# Patient Record
Sex: MALE | Race: BLACK OR AFRICAN AMERICAN | Employment: UNEMPLOYED | URBAN - METROPOLITAN AREA
[De-identification: names, ages, dates, MRNs, and addresses within clinical notes are randomized per-mention and may not be internally consistent; named-entity substitution may affect disease eponyms.]

---

## 2017-08-04 ENCOUNTER — OFFICE VISIT (OUTPATIENT)
Dept: HEMATOLOGY | Age: 67
End: 2017-08-04

## 2017-08-04 VITALS
TEMPERATURE: 95.9 F | HEART RATE: 40 BPM | BODY MASS INDEX: 20.99 KG/M2 | DIASTOLIC BLOOD PRESSURE: 88 MMHG | OXYGEN SATURATION: 99 % | WEIGHT: 146.6 LBS | SYSTOLIC BLOOD PRESSURE: 150 MMHG | HEIGHT: 70 IN

## 2017-08-04 DIAGNOSIS — R16.0 LIVER MASS: ICD-10-CM

## 2017-08-04 DIAGNOSIS — B18.2 CHRONIC HEPATITIS C WITHOUT HEPATIC COMA (HCC): Primary | ICD-10-CM

## 2017-08-04 PROBLEM — I10 HYPERTENSION: Status: ACTIVE | Noted: 2017-08-04

## 2017-08-04 PROBLEM — K74.60 CIRRHOSIS (HCC): Status: ACTIVE | Noted: 2017-08-04

## 2017-08-04 PROBLEM — K80.20 GALLSTONES: Status: ACTIVE | Noted: 2017-08-04

## 2017-08-04 NOTE — MR AVS SNAPSHOT
Visit Information Date & Time Provider Department Dept. Phone Encounter #  
 8/4/2017  8:30 AM Hardik Fine MD [facility] [address] [place] [address] [id number] Follow-up Instructions Return for FS with MLS. Upcoming Health Maintenance Date Due Hepatitis C Screening 1950 DTaP/Tdap/Td series (1 - Tdap) 7/2/1972 FOBT Q 1 YEAR AGE 50-75 7/2/2001 ZOSTER VACCINE AGE 60> 5/2/2011 GLAUCOMA SCREENING Q2Y 7/2/2016 Pneumococcal 65+ Low/Medium Risk (1 of 2 - PCV13) 7/2/2016 MEDICARE YEARLY EXAM 7/2/2016 INFLUENZA AGE 9 TO ADULT 8/1/2017 Allergies as of 8/4/2017  Review Complete On: 8/4/2017 By: Paulino David LPN No Known Allergies Current Immunizations  Never Reviewed No immunizations on file. Not reviewed this visit You Were Diagnosed With   
  
 Codes Comments Chronic hepatitis C without hepatic coma (HCC)    -  Primary ICD-10-CM: B18.2 ICD-9-CM: 070.54 Liver mass     ICD-10-CM: R16.0 ICD-9-CM: 573.9 Vitals BP Pulse Temp Height(growth percentile) 150/88 (BP 1 Location: Left arm, BP Patient Position: Sitting) (!) 40 95.9 °F (35.5 °C) (Tympanic) 5' 10\" (1.778 m) Weight(growth percentile) SpO2 BMI Smoking Status 146 lb 9.6 oz (66.5 kg) 99% 21.03 kg/m2 Current Some Day Smoker BMI and BSA Data Body Mass Index Body Surface Area 21.03 kg/m 2 1.81 m 2 Your Updated Medication List  
  
   
This list is accurate as of: 8/4/17  9:52 AM.  Always use your most recent med list.  
  
  
  
  
 hydrOXYzine HCl 50 mg tablet Commonly known as:  ATARAX Take 1 Tab by mouth every six (6) hours as needed for Itching. We Performed the Following AFP WITH AFP-L3% [[id number] Custom] CBC WITH AUTOMATED DIFF [53898 CPT(R)] HCV NS5A DRUG RESISTANCE ASSAY [[id number] Custom] HCV RNA BY NICHOLAS QL,RFLX TO QT [99675 CPT(R)] HEP A AB, TOTAL [id number] CPT(R)] HEP B SURFACE AB [id number] CPT(R)] HEP B SURFACE AG [id number] CPT(R)] HEP C GENOTYPE [id number] CPT(R)] HEPATIC FUNCTION PANEL [81565 CPT(R)] HEPATITIS B CORE AB, TOTAL [id number] CPT(R)] METABOLIC PANEL, BASIC [76249 CPT(R)] PROTHROMBIN TIME + INR [59718 CPT(R)] Follow-up Instructions Return for FS with MLS. To-Do List   
 09/03/2017 Imaging:  MRI ABD W WO CONT Patient Instructions FIBROSCAN PATIENT INFORMATION What is Fibroscan:? 
 
Fibroscan is an ultrasound device that measures liver stiffness by sending a pulse of vibrations through the liver. This translated into an immediate result that can help your healthcare team determine the level of damage to the liver as well as monitor the condition of various liver diseases over time. Fibroscan is helpful in the evaluation of the following conditions: 
 
Chronic Hepatitis C Chronic Hepatitis B Fatty Liver Disease Alcohol Liver Disease Chronic Cholestatic Liver Diseases What happens During the Scan? Patients receiving this exam lie flat on an examination table and raise the right arm above the head. The skin over the right lower rib cage is exposed and the examiner locates the correct area to be scanned. The prove of the scanner is placed directly on the patient and triggered to start. This fells like a gentle flick against the skin and should not be uncomfortable. At least ten (10) readings are taken and the average is calculated to score the amount of liver stiffness or scar tissue. The exam should take 10-20 minutes. What do I need to do to prepare for the scan? Please do not eat or drink anything 2-4 hours  Before your Fibroscan. You should continue taking any prescribed medication and can take small sips of water or clear fluid to do so,  But avoid drinking large amounts of fluid.   Please dress comfortably in clothes that will allow for easy access to the right side of the abdomen. Women are discouraged from wearing a dress on the day of the exam. 
 
Are there any special precautions? Patients who are pregnant or have an implantable device (for example, pacemaker or defibrillator) should not have this exam performed. Patients with a significant amount of fat tissue in the area the probe is pressed may be unable to have test performed. Introducing [facility]! Kaylee Gates introduces TouchFrame patient portal. Now you can access parts of your medical record, email your doctor's office, and request medication refills online. 1. In your internet browser, go to [email or web address] 2. Click on the First Time User? Click Here link in the Sign In box. You will see the New Member Sign Up page. 3. Enter your TouchFrame Access Code exactly as it appears below. You will not need to use this code after youve completed the sign-up process. If you do not sign up before the expiration date, you must request a new code. · TouchFrame Access Code: [email or web address] Expires: 11/2/2017  9:52 AM 
 
4. Enter the last four digits of your Social Security Number (xxxx) and Date of Birth (mm/dd/yyyy) as indicated and click Submit. You will be taken to the next sign-up page. 5. Create a TouchFrame ID. This will be your TouchFrame login ID and cannot be changed, so think of one that is secure and easy to remember. 6. Create a TouchFrame password. You can change your password at any time. 7. Enter your Password Reset Question and Answer. This can be used at a later time if you forget your password. 8. Enter your e-mail address. You will receive e-mail notification when new information is available in [address] 9. Click Sign Up. You can now view and download portions of your medical record. 10. Click the Download Summary menu link to download a portable copy of your medical information. If you have questions, please visit the Frequently Asked Questions section of the Gazzangt website. Remember, Smartzer is NOT to be used for urgent needs. For medical emergencies, dial 911. Now available from your iPhone and Android! Please provide this summary of care documentation to your next provider. Your primary care clinician is listed as Latha Ruiz. If you have any questions after today's visit, please call [phone].

## 2017-08-04 NOTE — PROGRESS NOTES
[address] Souza MD, [address], [place], [place]       ALEXANDRO Hickman PA-C Alicia Fling, NP Glean Bradley, NP        at [address], [address] [facility], [address]     [phone]     FAX: [phone]    at [address], [address] [facility], [address]     [phone]     FAX: [phone]       Patient Care Team:  Jaclyn Jones MD as PCP - General (Family Practice)      Problem List  Date Reviewed: 8/4/2017          Codes Class Noted    Chronic hepatitis C without hepatic coma [facility]) ICD-10-CM: B18.2  ICD-9-CM: 070.54  8/4/2017        Liver mass ICD-10-CM: R16.0  ICD-9-CM: 573.9  8/4/2017        Cirrhosis ([facility] ICD-10-CM: K74.60  ICD-9-CM: 571.5  8/4/2017        Hypertension ICD-10-CM: I10  ICD-9-CM: 401.9  8/4/2017        Gallstones ICD-10-CM: K80.20  ICD-9-CM: 574.20  8/4/2017                The physicians listed above have asked me to see Teresa Aceves in consultation regarding chronic HCV and its management. All medical records sent by the referring physicians were reviewed including imaging studies     The patient is a 78 y.o. Black male who was noted to have abnormalities in liver chemistries and subsequently tested positive for chronic HCV in 2010. Risk factors for acquiring HCV are IV drug use in 1970s. There was no history of acute incteric hepatitis at the time of these risk factors. MRI of the liver was performed at [facility] in 5/2017. The results of the imaging suggested cirrhosis with a liver mass concerning for HCC vs dysplastic nodule LIRADs-3. An assessment of liver fibrosis with biopsy or elastography has not been performed. The patient has never received treatment for chronic HCV. The most recent laboratory studies are not available.     The patient notes fatigue,     The patient has not experienced pain in the right side over the liver, problems concentrating, swelling of the abdomen, swelling of the lower extremities, hematemesis, hematochezia. The patient completes all daily activities without any functional limitations. ALLERGIES  No Known Allergies    MEDICATIONS  Current Outpatient Prescriptions   Medication Sig    hydrOXYzine (ATARAX) 50 mg tablet Take 1 Tab by mouth every six (6) hours as needed for Itching. No current facility-administered medications for this visit. SYSTEM REVIEW NOT RELATED TO LIVER DISEASE OR REVIEWED ABOVE:  Constitution systems: Negative for fever, chills, weight gain, weight loss. Eyes: Negative for visual changes. ENT: Negative for sore throat, painful swallowing. Respiratory: Negative for cough, hemoptysis, SOB. Cardiology: Negative for chest pain, palpitations. GI:  Negative for constipation or diarrhea. : Negative for urinary frequency, dysuria, hematuria, nocturia. Skin: Negative for rash. Hematology: Negative for easy bruising, blood clots. Musculo-skelatal: Negative for back pain, muscle pain, weakness. Neurologic: Negative for headaches, dizziness, vertigo, memory problems not related to HE. Psychology: Negative for anxiety, depression. FAMILY HISTORY:  The father  of unknown cause. The mother is alive and healthy. There is no family history of liver disease. SOCIAL HISTORY:  The patient has never been . The patient has 1 child. The patient currently smokes half pack of tobacco daily. The patient consumes 3 alcoholic beverages per day. The patient used to work . The patient has not worked since [address] [place] [address]. PHYSICAL EXAMINATION:  Visit Vitals    /88 (BP 1 Location: Left arm, BP Patient Position: Sitting)    Pulse (!) 40    Temp 95.9 °F (35.5 °C) (Tympanic)    Ht 5' 10\" (1.778 m)    Wt 146 lb 9.6 oz (66.5 kg)    SpO2 99%    BMI 21.03 kg/m2     General: No acute distress.    Eyes: Sclera anicteric. ENT: No oral lesions. Thyroid normal.  Nodes: No adenopathy. Skin: No spider angiomata. No jaundice. No palmar erythema. Respiratory: Lungs clear to auscultation. Cardiovascular: Regular heart rate. No murmurs. No JVD. Abdomen: Soft non-tender. Liver size normal to percussion/palpation. Spleen not palpable. No obvious ascites. Extremities: No edema. No muscle wasting. No gross arthritic changes. Neurologic: Alert and oriented. Cranial nerves grossly intact. No asterixis. LABORATORY STUDIES:  [facility] [place] of [address] [place] [address] Units 8/4/2017   WBC 3.4 - 10.8 x10E3/uL 8.0   ANC 1.4 - 7.0 x10E3/uL 2.3   HGB 12.6 - 17.7 g/dL 17.5    - 379 x10E3/uL 252   INR 0.8 - 1.2 1.1   AST 0 - 40 IU/L 123 (H)   ALT 0 - 44 IU/L 114 (H)   Alk Phos 39 - 117 IU/L 95   Bili, Total 0.0 - 1.2 mg/dL 0.7   Bili, Direct 0.00 - 0.40 mg/dL 0.30   Albumin 3.6 - 4.8 g/dL 4.0   BUN 8 - 27 mg/dL 7 (L)   Creat 0.76 - 1.27 mg/dL 0.76   Na 134 - 144 mmol/L 143   K 3.5 - 5.2 mmol/L 4.8   Cl 96 - 106 mmol/L 100   CO2 18 - 29 mmol/L 25   Glucose 65 - 99 mg/dL 91     SEROLOGIES:  Serologies Latest Ref Rng & Units 8/4/2017   Hep A Ab, Total Negative Positive (A)   Hep B Surface Ag Negative Negative   Hep B Core Ab, Total Negative Positive (A)   Hep B Surface AB QL  Reactive   Hep C Genotype  1b   HCV RT-PCR, Quant IU/mL [id number]     LIVER HISTOLOGY:  Not available or performed    ENDOSCOPIC PROCEDURES:  Not available or performed    RADIOLOGY:  5/2017. Dynamic MRI at [facility]. Changes consistent with cirrhosis. 2.2 x 2.9 cm lesion left lobe with enhancement and washout but no rim enhancement. Consistent with dysplastic nodule. LIRADS-3    OTHER TESTING:  Not available or performed    ASSESSMENT AND PLAN:  Chronic HCV with cirrhosis. Have performed laboratory testing to monitor liver function and degree of liver injury. This included BMP, hepatic panel, CBC with platelet count, INR.       Liver function is normal.  The platelet count is normal.      Based upon laboratory studies and imaging  the patient appears to have cirrhosis. Will perform and/or review results of HCV viral load and HCV genotype to define the specific treatment and duration of treatment that will be required. Will perform serologic and virologic studies to assess for other causes of chronic liver disease. Will perform imaging of the liver with dynamic MRI. The last MRI was in 5/2017 and given the findins this should be repeated in 3 months which is now. There is no reason to perform liver biopsy at this time. The Fibroscan can assess liver fibrosis and determine if a patient has advanced fibrosis or cirrhosis without the need for liver biopsy. The Fibroscan is currently available at liver [place]. This will be performed at the next office visit. The patient has not previously been treated for HCV. The patient has HCV genotype 1B. Discussed the treatment alternatives. The SVR/cure rate for HCV now exceeds 90% with just oral anti-viral therapy and no interferon injections or significant side effects for most patients with HCV. The specific treatment is dependent upon genotype, viral load and histology. The patient should be treated with Harvoni (sofasbuvir and ledipasvir), Zepetier (Grazaprevir and Elbasvir), Mavyret (Gleceprevir and Piprentasvir), or Epclusa (sofosbuvir and valpitasvir) for 8-12 weeks. At this point will defer [address] treatment until we know the results of the repeat MRI. If this is not definative for [address] I think it is reasonable to proceed with treatment of [address] at this time. The patient was directed to continue all current medications at the current dosages. There are no contraindications for the patient to take any medications that are necessary for treatment of other medical issues. The patient was counseled regarding alcohol consumption.       Vaccination for viral hepatitis A and B is not needed. The patient has serologic evidence of prior exposure or vaccination with immunity. All of the above issues were discussed with the patient. All questions were answered. The patient expressed a clear understanding of the above. Follow-up [facility] [place] [facility] [place] for [address] and to to initiate HCV treatment.   He will need repeat MRI in     Aura Romberg, MD  [facility] [address] [address]  [phone]

## 2017-08-05 LAB
ALBUMIN SERPL-MCNC: 4 G/DL (ref 3.6–4.8)
ALP SERPL-CCNC: 95 IU/L (ref 39–117)
ALT SERPL-CCNC: 114 IU/L (ref 0–44)
AST SERPL-CCNC: 123 IU/L (ref 0–40)
BASOPHILS # BLD AUTO: 0 X10E3/UL (ref 0–0.2)
BASOPHILS NFR BLD AUTO: 0 %
BILIRUB DIRECT SERPL-MCNC: 0.3 MG/DL (ref 0–0.4)
BILIRUB SERPL-MCNC: 0.7 MG/DL (ref 0–1.2)
BUN SERPL-MCNC: 7 MG/DL (ref 8–27)
BUN/CREAT SERPL: 9 (ref 10–24)
CALCIUM SERPL-MCNC: 9.6 MG/DL (ref 8.6–10.2)
CHLORIDE SERPL-SCNC: 100 MMOL/L (ref 96–106)
CO2 SERPL-SCNC: 25 MMOL/L (ref 18–29)
CREAT SERPL-MCNC: 0.76 MG/DL (ref 0.76–1.27)
EOSINOPHIL # BLD AUTO: 0.3 X10E3/UL (ref 0–0.4)
EOSINOPHIL NFR BLD AUTO: 3 %
ERYTHROCYTE [DISTWIDTH] IN BLOOD BY AUTOMATED COUNT: 13.4 % (ref 12.3–15.4)
GLUCOSE SERPL-MCNC: 91 MG/DL (ref 65–99)
HAV AB SER QL IA: POSITIVE
HBV CORE AB SERPL QL IA: POSITIVE
HBV SURFACE AB SER QL: REACTIVE
HBV SURFACE AG SERPL QL IA: NEGATIVE
HCT VFR BLD AUTO: 50.7 % (ref 37.5–51)
HGB BLD-MCNC: 17.5 G/DL (ref 12.6–17.7)
IMM GRANULOCYTES # BLD: 0 X10E3/UL (ref 0–0.1)
IMM GRANULOCYTES NFR BLD: 0 %
INR PPP: 1.1 (ref 0.8–1.2)
LYMPHOCYTES # BLD AUTO: 4.3 X10E3/UL (ref 0.7–3.1)
LYMPHOCYTES NFR BLD AUTO: 54 %
MCH RBC QN AUTO: 31.6 PG (ref 26.6–33)
MCHC RBC AUTO-ENTMCNC: 34.5 G/DL (ref 31.5–35.7)
MCV RBC AUTO: 92 FL (ref 79–97)
MONOCYTES # BLD AUTO: 1.2 X10E3/UL (ref 0.1–0.9)
MONOCYTES NFR BLD AUTO: 15 %
NEUTROPHILS # BLD AUTO: 2.3 X10E3/UL (ref 1.4–7)
NEUTROPHILS NFR BLD AUTO: 28 %
PLATELET # BLD AUTO: 252 X10E3/UL (ref 150–379)
POTASSIUM SERPL-SCNC: 4.8 MMOL/L (ref 3.5–5.2)
PROT SERPL-MCNC: 7.1 G/DL (ref 6–8.5)
PROTHROMBIN TIME: 11.4 SEC (ref 9.1–12)
RBC # BLD AUTO: 5.54 X10E6/UL (ref 4.14–5.8)
SODIUM SERPL-SCNC: 143 MMOL/L (ref 134–144)
WBC # BLD AUTO: 8 X10E3/UL (ref 3.4–10.8)

## 2017-08-07 LAB
AFP L3 MFR SERPL: 34 % (ref 0–9.9)
AFP SERPL-MCNC: 56.1 NG/ML (ref 0–8)

## 2017-08-08 LAB
HCV GENTYP SERPL NAA+PROBE: NORMAL
HCV RNA SERPL NAA+PROBE-ACNC: NORMAL IU/ML
HCV RNA SERPL NAA+PROBE-LOG IU: 5.55 LOG10 IU/ML
HCV RNA SERPL QL NAA+PROBE: POSITIVE
PLEASE NOTE, 550474: NORMAL
TEST INFORMATION: NORMAL

## 2017-08-14 LAB
HCV NS5 MUT DET ISLT GENOTYP: NORMAL
HCV RESIS PANELL ISLT GENOTYP: NORMAL
REF LAB TEST METHOD: NORMAL

## 2017-08-21 ENCOUNTER — TELEPHONE (OUTPATIENT)
Dept: HEMATOLOGY | Age: 67
End: 2017-08-21

## 2017-08-21 NOTE — TELEPHONE ENCOUNTER
Left voice mail for patient to return call. Has he received a call from Coordination of Care to schedule future MRI? Also, reminded patient to  previous MRI from [address] [place] [address].

## 2017-08-21 NOTE — TELEPHONE ENCOUNTER
----- Message from Mark Issa MD sent at 8/13/2017  7:21 AM EDT -----  Please make sure MRI gets scheduled. I ordered this on 8/4 and do not see it is scheduled. Tell patient to  copy of previous MRI from [facility] and bring it with him to repeat MRI and hand it to tech for reading and comparison.   QUINTON

## 2017-09-12 ENCOUNTER — HOSPITAL ENCOUNTER (OUTPATIENT)
Dept: MRI IMAGING | Age: 67
Discharge: HOME | End: 2017-09-12
Attending: INTERNAL MEDICINE
Payer: MEDICARE

## 2017-09-12 DIAGNOSIS — B18.2 CHRONIC HEPATITIS C WITHOUT HEPATIC COMA (HCC): ICD-10-CM

## 2017-09-12 DIAGNOSIS — R16.0 LIVER MASS: ICD-10-CM

## 2017-09-12 PROCEDURE — 74011250636 HC RX REV CODE- 250/636: Performed by: RADIOLOGY

## 2017-09-12 PROCEDURE — 74183 MRI ABD W/O CNTR FLWD CNTR: CPT

## 2017-09-12 PROCEDURE — 74011000258 HC RX REV CODE- 258: Performed by: RADIOLOGY

## 2017-09-12 PROCEDURE — A9585 GADOBUTROL INJECTION: HCPCS | Performed by: RADIOLOGY

## 2017-09-12 RX ORDER — SODIUM CHLORIDE 9 MG/ML
50 INJECTION, SOLUTION INTRAVENOUS
Status: COMPLETED | OUTPATIENT
Start: 2017-09-12 | End: 2017-09-12

## 2017-09-12 RX ADMIN — GADOBUTROL 10 ML: 604.72 INJECTION INTRAVENOUS at 09:21

## 2017-09-12 RX ADMIN — SODIUM CHLORIDE 50 ML: 900 INJECTION, SOLUTION INTRAVENOUS at 09:00

## 2017-09-18 ENCOUNTER — TELEPHONE (OUTPATIENT)
Dept: HEMATOLOGY | Age: 67
End: 2017-09-18

## 2017-09-18 NOTE — TELEPHONE ENCOUNTER
Left voice mail for patient to return call. Attempted to contact patient to inform that the spot found on the MRI at [facility] is not present on the MRI done today at [facility] - per Dr. Aiyana White. Patient is to keep follow up appointment (10/6/17) to go results in more detail. Dr. Aiyana White will order a CT scan for patient to get in 3 months.

## 2017-10-06 ENCOUNTER — OFFICE VISIT (OUTPATIENT)
Dept: HEMATOLOGY | Age: 67
End: 2017-10-06

## 2017-10-06 VITALS
HEIGHT: 70 IN | BODY MASS INDEX: 20.67 KG/M2 | DIASTOLIC BLOOD PRESSURE: 81 MMHG | OXYGEN SATURATION: 98 % | WEIGHT: 144.4 LBS | TEMPERATURE: 98.5 F | HEART RATE: 55 BPM | SYSTOLIC BLOOD PRESSURE: 155 MMHG

## 2017-10-06 DIAGNOSIS — B18.2 CHRONIC HEPATITIS C WITHOUT HEPATIC COMA (HCC): Primary | ICD-10-CM

## 2017-10-06 NOTE — MR AVS SNAPSHOT
Visit Information Date & Time Provider Department Dept. Phone Encounter #  
 10/6/2017  4:30 PM Mani Estrada. [address] of [facility] [place] [facility] [place] [address] [id number] Upcoming Health Maintenance Date Due DTaP/Tdap/Td series (1 - Tdap) 7/2/1972 FOBT Q 1 YEAR AGE 50-75 7/2/2001 ZOSTER VACCINE AGE 60> 5/2/2011 GLAUCOMA SCREENING Q2Y 7/2/2016 Pneumococcal 65+ Low/Medium Risk (1 of 2 - PCV13) 7/2/2016 MEDICARE YEARLY EXAM 7/2/2016 INFLUENZA AGE 9 TO ADULT 8/1/2017 Allergies as of 10/6/2017  Review Complete On: 10/6/2017 By: Herschel Boas, LPN No Known Allergies Current Immunizations  Never Reviewed No immunizations on file. Not reviewed this visit Vitals BP Pulse Temp Height(growth percentile) 155/81 (BP 1 Location: Right arm, BP Patient Position: Sitting) (!) 55 98.5 °F (36.9 °C) (Tympanic) 5' 10\" (1.778 m) Weight(growth percentile) SpO2 BMI Smoking Status 144 lb 6.4 oz (65.5 kg) 98% 20.72 kg/m2 Current Some Day Smoker BMI and BSA Data Body Mass Index Body Surface Area 20.72 kg/m 2 1.8 m 2 Your Updated Medication List  
  
   
This list is accurate as of: 10/6/17  4:42 PM.  Always use your most recent med list.  
  
  
  
  
 hydrOXYzine HCl 50 mg tablet Commonly known as:  ATARAX Take 1 Tab by mouth every six (6) hours as needed for Itching. Introducing [facility]! [place] introduces [employer] patient portal. Now you can access parts of your medical record, email your doctor's office, and request medication refills online. 1. In your internet browser, go to [email or web address] 2. Click on the First Time User? Click Here link in the Sign In box. You will see the New Member Sign Up page. 3. Enter your [employer] Access Code exactly as it appears below. You will not need to use this code after youve completed the sign-up process.  If you do not sign up before the expiration date, you must request a new code. · [email or web address] Access Code: [email or web address] Expires: 11/2/2017  9:52 AM 
 
4. Enter the last four digits of your Social Security Number (xxxx) and Date of Birth (mm/dd/yyyy) as indicated and click Submit. You will be taken to the next sign-up page. 5. Create a Nebel.TV ID. This will be your [employer] login ID and cannot be changed, so think of one that is secure and easy to remember. 6. Create a [employer] password. You can change your password at any time. 7. Enter your Password Reset Question and Answer. This can be used at a later time if you forget your password. 8. Enter your e-mail address. You will receive e-mail notification when new information is available in [address] 9. Click Sign Up. You can now view and download portions of your medical record. 10. Click the Download Summary menu link to download a portable copy of your medical information. If you have questions, please visit the Frequently Asked Questions section of the [employer] website. Remember, [employer] is NOT to be used for urgent needs. For medical emergencies, dial 911. Now available from your iPhone and Android! Please provide this summary of care documentation to your next provider. Your primary care clinician is listed as Jessica Scanlon. If you have any questions after today's visit, please call [phone].

## 2017-10-06 NOTE — PROGRESS NOTES
[address] Jani De Souza MD, [place], [facility] Eder Rom[place], [place]       ALEXANDRO Jerry PA-C Matthew Kail, NP Jeneane Double, NP        at [address], [address] [facility], [address].     [phone]     FAX: [phone]    at [facility], [address] [place] [address], [address]     [phone]     FAX: [phone]       Patient Care Team:  Juanita Fitzpatrick MD as PCP - General (Family Practice)      Problem List  Date Reviewed: 8/13/2017          Codes Class Noted    Chronic hepatitis C without hepatic coma [facility]) ICD-10-CM: B18.2  ICD-9-CM: 070.54  8/4/2017        Liver mass ICD-10-CM: R16.0  ICD-9-CM: 573.9  8/4/2017        Cirrhosis ([facility] ICD-10-CM: K74.60  ICD-9-CM: 571.5  8/4/2017        Hypertension ICD-10-CM: I10  ICD-9-CM: 401.9  8/4/2017        Gallstones ICD-10-CM: K80.20  ICD-9-CM: 574.20  8/4/2017              Lonni Sprain returns to the [address] for management of chronic HCV. The active problem list, all pertinent past medical history, medications, radiologic findings and laboratory findings related to the liver disorder were reviewed with the patient. The patient is a 78 y.o. Black male who tested positive for chronic HCV in 2010. MRI of the liver was performed at [facility] in 5/2017. The results of the imaging suggested cirrhosis with a liver mass concerning for HCC vs dysplastic nodule LIRADs-3. Assessment of liver fibrosis was performed in the office today with Fibroscan. The result was 15.1 kPa which correlates with cirrhosis. The most recent imaging of the liver was MRI performed in 9/2017. Results suggest chronic liver disease. No defined liver mass was identified. The patient has never received treatment for chronic HCV.       The most recent laboratory studies indicate that the liver transaminases are elevated, alkaline phosphatase is normal, tests of hepatic synthetic and metabolic function are normal, and the platelet count is normal.      The patient notes fatigue,     The patient has not experienced pain in the right side over the liver, problems concentrating, swelling of the abdomen, swelling of the lower extremities, hematemesis, hematochezia. The patient completes all daily activities without any functional limitations. ALLERGIES  No Known Allergies    MEDICATIONS  Current Outpatient Prescriptions   Medication Sig    hydrOXYzine (ATARAX) 50 mg tablet Take 1 Tab by mouth every six (6) hours as needed for Itching. No current facility-administered medications for this visit. SYSTEM REVIEW NOT RELATED TO LIVER DISEASE OR REVIEWED ABOVE:  Constitution systems: Negative for fever, chills, weight gain, weight loss. Eyes: Negative for visual changes. ENT: Negative for sore throat, painful swallowing. Respiratory: Negative for cough, hemoptysis, SOB. Cardiology: Negative for chest pain, palpitations. GI:  Negative for constipation or diarrhea. : Negative for urinary frequency, dysuria, hematuria, nocturia. Skin: Negative for rash. Hematology: Negative for easy bruising, blood clots. Musculo-skelatal: Negative for back pain, muscle pain, weakness. Neurologic: Negative for headaches, dizziness, vertigo, memory problems not related to HE. Psychology: Negative for anxiety, depression. FAMILY HISTORY:  The father  of unknown cause. The mother is alive and healthy. There is no family history of liver disease. SOCIAL HISTORY:  The patient has never been . The patient has 1 child. The patient currently smokes half pack of tobacco daily. The patient consumes 3 alcoholic beverages per day. The patient used to work . The patient has not worked since [address] [place] [address].         PHYSICAL EXAMINATION:  Visit Vitals    /81 (BP 1 Location: Right arm, BP Patient Position: Sitting)    Pulse (!) 55    Temp 98.5 °F (36.9 °C) (Tympanic)    Ht 5' 10\" (1.778 m)    Wt 144 lb 6.4 oz (65.5 kg)    SpO2 98%    BMI 20.72 kg/m2     General: No acute distress. Eyes: Sclera anicteric. ENT: No oral lesions. Thyroid normal.  Nodes: No adenopathy. Skin: No spider angiomata. No jaundice. No palmar erythema. Respiratory: Lungs clear to auscultation. Cardiovascular: Regular heart rate. No murmurs. No JVD. Abdomen: Soft non-tender. Liver size normal to percussion/palpation. Spleen not palpable. No obvious ascites. Extremities: No edema. No muscle wasting. No gross arthritic changes. Neurologic: Alert and oriented. Cranial nerves grossly intact. No asterixis. LABORATORY STUDIES:  Liver [place] of [address] [place] [address] Units 8/4/2017   WBC 3.4 - 10.8 x10E3/uL 8.0   ANC 1.4 - 7.0 x10E3/uL 2.3   HGB 12.6 - 17.7 g/dL 17.5    - 379 x10E3/uL 252   INR 0.8 - 1.2 1.1   AST 0 - 40 IU/L 123 (H)   ALT 0 - 44 IU/L 114 (H)   Alk Phos 39 - 117 IU/L 95   Bili, Total 0.0 - 1.2 mg/dL 0.7   Bili, Direct 0.00 - 0.40 mg/dL 0.30   Albumin 3.6 - 4.8 g/dL 4.0   BUN 8 - 27 mg/dL 7 (L)   Creat 0.76 - 1.27 mg/dL 0.76   Na 134 - 144 mmol/L 143   K 3.5 - 5.2 mmol/L 4.8   Cl 96 - 106 mmol/L 100   CO2 18 - 29 mmol/L 25   Glucose 65 - 99 mg/dL 91     SEROLOGIES:  Serologies Latest Ref Rng & Units 8/4/2017   Hep A Ab, Total Negative Positive (A)   Hep B Surface Ag Negative Negative   Hep B Core Ab, Total Negative Positive (A)   Hep B Surface AB QL  Reactive   Hep C Genotype  1b   HCV RT-PCR, Quant IU/mL [id number]     LIVER HISTOLOGY:  10/2017. FibroScan performed at [facility]. EkPa was 15.1. IQR/med 9%. The results suggested a fibrosis level of F3-4. ENDOSCOPIC PROCEDURES:  Not available or performed    RADIOLOGY:  5/2017. Dynamic MRI at [facility]. Changes consistent with cirrhosis.   2.2 x 2.9 cm lesion left lobe with enhancement and washout but no rim enhancement. Consistent with dysplastic nodule. LIRADS-3  9/2017. Dynamic MRI of liver. Normal appearing liver. No evidence of cirrhosis. No liver mass lesions. Normal spleen. No dilated bile ducts. No bile duct strictures. No ascites. OTHER TESTING:  Not available or performed    ASSESSMENT AND PLAN:  Chronic HCV with stage 3 advanced fibrosis/cirrhosis. Liver function is normal.  The platelet count is normal.      An MRI at [facility] in 5/2017 suggested a liver mass LIRADS-3. Repeat MRI at [facility] in 9/2017 did not suggest cirrhosis and no definative mass was seen. Will need to repeat MRI again in 3 months which would be 12/2017 for at least another 6 months. .      The patient has not previously been treated for HCV. The patient has HCV genotype 1B. Discussed the treatment alternatives. The SVR/cure rate for HCV now exceeds 90% with just oral anti-viral therapy and no interferon injections or significant side effects for most patients with HCV. The specific treatment is dependent upon genotype, viral load and histology. The patient should be treated with Harvoni (sofasbuvir and ledipasvir), Zepetier (Grazaprevir and Elbasvir), Mavyret (Gleceprevir and Piprentasvir), or Epclusa (sofosbuvir and valpitasvir) for 8-12 weeks. Will proceed with HCV treatment. I do not think the patient has [address] but will repeat the MRI at 3 month intervals until we are certain. The patient was directed to continue all current medications at the current dosages. There are no contraindications for the patient to take any medications that are necessary for treatment of other medical issues. The patient was counseled regarding alcohol consumption. Vaccination for viral hepatitis A and B is not needed. The patient has serologic evidence of prior exposure or vaccination with immunity. All of the above issues were discussed with the patient. All questions were answered.   The patient expressed a clear understanding of the above. Follow-up [facility] for [address] and to to initiate HCV treatment.       Nicolette Garcia MD  Liver [facility] [address] [address] [facility] [address] [place] [address], [facility] 22.  [phone]

## 2017-11-02 DIAGNOSIS — B18.2 CHRONIC HEPATITIS C WITHOUT HEPATIC COMA (HCC): Primary | ICD-10-CM

## 2017-11-02 RX ORDER — LEDIPASVIR AND SOFOSBUVIR 90; 400 MG/1; MG/1
90-400 TABLET, FILM COATED ORAL DAILY
Qty: 28 TAB | Refills: 2 | Status: SHIPPED | OUTPATIENT
Start: 2017-11-02 | End: 2018-07-12 | Stop reason: ALTCHOICE

## 2017-11-20 ENCOUNTER — DOCUMENTATION ONLY (OUTPATIENT)
Dept: HEMATOLOGY | Age: 67
End: 2017-11-20

## 2017-11-20 DIAGNOSIS — R16.0 LIVER MASS: Primary | ICD-10-CM

## 2017-11-20 NOTE — PROGRESS NOTES
Sent email to JYOTHI Chou to call pt and advise him of follow up MRI ordered for 12/18/17. Will call him with results after obtained. Tamara Mccloud NP  8:38 AM

## 2018-03-26 ENCOUNTER — OFFICE VISIT (OUTPATIENT)
Dept: HEMATOLOGY | Age: 68
End: 2018-03-26

## 2018-03-26 VITALS
WEIGHT: 146 LBS | BODY MASS INDEX: 20.95 KG/M2 | DIASTOLIC BLOOD PRESSURE: 86 MMHG | OXYGEN SATURATION: 100 % | TEMPERATURE: 98 F | HEART RATE: 58 BPM | SYSTOLIC BLOOD PRESSURE: 153 MMHG

## 2018-03-26 DIAGNOSIS — R16.0 LIVER MASS: ICD-10-CM

## 2018-03-26 DIAGNOSIS — B18.2 CHRONIC HEPATITIS C WITHOUT HEPATIC COMA (HCC): Primary | ICD-10-CM

## 2018-03-26 NOTE — PROGRESS NOTES
1. Have you been to the ER, urgent care clinic since your last visit? Hospitalized since your last visit? No    2. Have you seen or consulted any other health care providers outside of the [address] [place] [address] since your last visit? Include any pap smears or colon screening. No   Chief Complaint   Patient presents with    Follow-up     Visit Vitals    /86 (BP 1 Location: Left arm, BP Patient Position: Sitting)    Pulse (!) 58    Temp 98 °F (36.7 °C) (Tympanic)    Wt 146 lb (66.2 kg)    SpO2 100%    BMI 20.95 kg/m2     PHQ over the last two weeks 3/26/2018   Little interest or pleasure in doing things Not at all   Feeling down, depressed or hopeless Not at all   Total Score PHQ 2 0     Fall Risk Assessment, last 12 mths 3/26/2018   Able to walk? Yes   Fall in past 12 months?  No   Fall with injury? -   Number of falls in past 12 months -   Fall Risk Score -     Learning Assessment 3/26/2018   PRIMARY LEARNER Patient   BARRIERS PRIMARY LEARNER NONE   CO-LEARNER CAREGIVER No   PRIMARY LANGUAGE ENGLISH   LEARNER PREFERENCE PRIMARY LISTENING   ANSWERED BY patient    RELATIONSHIP SELF

## 2018-03-26 NOTE — PROGRESS NOTES
[address] Jani De Souza MD, [address], [facility] [place]       Bhargav Massey, GABRIELA Bundy NP Gillian Pole, ALEXANDRO        at [address], [address] [address]  [address].     [phone]     FAX: [phone]    at [facility], [address] [place] [address], [address]     [phone]     FAX: [phone]       Patient Care Team:  Naeem Holland MD as PCP - General (Family Practice)      Problem List  Date Reviewed: 10/8/2017          Codes Class Noted    Chronic hepatitis C without hepatic coma [facility]) ICD-10-CM: B18.2  ICD-9-CM: 070.54  8/4/2017        Liver mass ICD-10-CM: R16.0  ICD-9-CM: 573.9  8/4/2017        Cirrhosis ([facility] ICD-10-CM: K74.60  ICD-9-CM: 571.5  8/4/2017        Hypertension ICD-10-CM: I10  ICD-9-CM: 401.9  8/4/2017        Gallstones ICD-10-CM: K80.20  ICD-9-CM: 574.20  8/4/2017              Paul Anrold returns to the [address] for management of chronic HCV. The active problem list, all pertinent past medical history, medications, radiologic findings and laboratory findings related to the liver disorder were reviewed with the patient. The patient is a 78 y.o. Black male who tested positive for chronic HCV in 2010. MRI of the liver was performed at [facility] in 5/2017. The results of the imaging suggested cirrhosis with a liver mass concerning for HCC vs dysplastic nodule LIRADs-3. Assessment of liver fibrosis was performed in the office today with FibroScan. The result was 15.1 kPa which correlates with cirrhosis. The most recent imaging of the liver was MRI performed in 9/2017. Results suggest chronic liver disease. No defined liver mass was identified. The patient is currently on 12 weeks of treatment with Carvin Peabody. He started in December and still has 2 bottles left.  He has missed or rescheduled all previous treatment follow up appointments. He states he takes it about 3 times a week. The most recent laboratory studies indicate that the liver transaminases are elevated, alkaline phosphatase is normal, tests of hepatic synthetic and metabolic function are normal, and the platelet count is normal.      The patient notes fatigue. The patient has not experienced pain in the right side over the liver, problems concentrating, swelling of the abdomen, swelling of the lower extremities, hematemesis, hematochezia. The patient completes all daily activities without any functional limitations. ALLERGIES  No Known Allergies    MEDICATIONS  Current Outpatient Prescriptions   Medication Sig    ledipasvir-sofosbuvir (HARVONI)  mg tab Take  mg by mouth daily. Indications: CHRONIC HEPATITIS C - GENOTYPE 1, 1B, cirrhosis, treatment naive     No current facility-administered medications for this visit. SYSTEM REVIEW NOT RELATED TO LIVER DISEASE OR REVIEWED ABOVE:  Constitution systems: Negative for fever, chills, weight gain, weight loss. Eyes: Negative for visual changes. ENT: Negative for sore throat, painful swallowing. Respiratory: Negative for cough, hemoptysis, SOB. Cardiology: Negative for chest pain, palpitations. GI:  Negative for constipation or diarrhea. : Negative for urinary frequency, dysuria, hematuria, nocturia. Skin: Negative for rash. Hematology: Negative for easy bruising, blood clots. Musculo-skeletal: Negative for back pain, muscle pain, weakness. Neurologic: Negative for headaches, dizziness, vertigo, memory problems not related to HE. Psychology: Negative for anxiety, depression. FAMILY HISTORY:  The father  of unknown cause. The mother is alive and healthy. There is no family history of liver disease. SOCIAL HISTORY:  The patient has never been . The patient has 1 child.     The patient currently smokes half pack of tobacco daily.    The patient consumes 3 alcoholic beverages per day. The patient used to work . The patient has not worked since [address] [place] [address]. PHYSICAL EXAMINATION:  Visit Vitals    /86 (BP 1 Location: Left arm, BP Patient Position: Sitting)    Pulse (!) 58    Temp 98 °F (36.7 °C) (Tympanic)    Wt 146 lb (66.2 kg)    SpO2 100%    BMI 20.95 kg/m2     General: No acute distress. Eyes: Sclera anicteric. ENT: No oral lesions. Nodes: No adenopathy. Skin: No spider angiomata. No jaundice. No palmar erythema. Respiratory: Lungs clear to auscultation. Cardiovascular: Regular heart rate. No murmurs. No JVD. Abdomen: Soft non-tender. Liver size normal to percussion/palpation. Spleen not palpable. No obvious ascites. Extremities: No edema. No muscle wasting. No gross arthritic changes. Neurologic: Alert and oriented. Cranial nerves grossly intact. No asterixis. LABORATORY STUDIES:  Liver [place] of [address] Units 8/4/2017   WBC 3.4 - 10.8 x10E3/uL 8.0   ANC 1.4 - 7.0 x10E3/uL 2.3   HGB 12.6 - 17.7 g/dL 17.5    - 379 x10E3/uL 252   INR 0.8 - 1.2 1.1   AST 0 - 40 IU/L 123 (H)   ALT 0 - 44 IU/L 114 (H)   Alk Phos 39 - 117 IU/L 95   Bili, Total 0.0 - 1.2 mg/dL 0.7   Bili, Direct 0.00 - 0.40 mg/dL 0.30   Albumin 3.6 - 4.8 g/dL 4.0   BUN 8 - 27 mg/dL 7 (L)   Creat 0.76 - 1.27 mg/dL 0.76   Na 134 - 144 mmol/L 143   K 3.5 - 5.2 mmol/L 4.8   Cl 96 - 106 mmol/L 100   CO2 18 - 29 mmol/L 25   Glucose 65 - 99 mg/dL 91     SEROLOGIES:  Serologies Latest Ref Rng & Units 8/4/2017   Hep A Ab, Total Negative Positive (A)   Hep B Surface Ag Negative Negative   Hep B Core Ab, Total Negative Positive (A)   Hep B Surface AB QL  Reactive   Hep C Genotype  1b   HCV RT-PCR, Quant IU/mL [id number]     LIVER HISTOLOGY:  10/2017. [facility]. EkPa was 15.1. IQR/med 9%. The results suggested a fibrosis level of F3-4.     ENDOSCOPIC PROCEDURES:  Not available or performed    RADIOLOGY:  5/2017. Dynamic MRI at [facility]. Changes consistent with cirrhosis. 2.2 x 2.9 cm lesion left lobe with enhancement and washout but no rim enhancement. Consistent with dysplastic nodule. LIRADS-3  9/2017. Dynamic MRI of liver. Normal appearing liver. No evidence of cirrhosis. No liver mass lesions. Normal spleen. No dilated bile ducts. No bile duct strictures. No ascites. OTHER TESTING:  Not available or performed    ASSESSMENT AND PLAN:  Chronic HCV with stage 3 advanced fibrosis/cirrhosis. Liver function is normal.  The platelet count is normal.      An MRI at [facility] in 5/2017 suggested a liver mass LIRADS-3. Repeat MRI at [facility] in 9/2017 did not suggest cirrhosis and no definitive mass was seen. Will need to repeat MRI again in 3 months which would be 12/2017 for at least another 6 months. This was ordered today. The patient is on Harvoni. He has been at times non-compliant with medication. I have emphatically stressed the importance of taking the medication consistently and daily. The patient has HCV genotype 1B. The patient was directed to continue all current medications at the current dosages. There are no contraindications for the patient to take any medications that are necessary for treatment of other medical issues. The patient was counseled regarding alcohol consumption. Vaccination for viral hepatitis A and B is not needed. The patient has serologic evidence of prior exposure or vaccination with immunity. All of the above issues were discussed with the patient. All questions were answered. The patient expressed a clear understanding of the above. [facility] 87 in 2 months.      Stephani Pandya MD  Liver [place] of [address] [place] [address] [address] [facility].  [phone]

## 2018-03-26 NOTE — MR AVS SNAPSHOT
2700 [facility] [phone] [address] [place] [address] 
[phone] Patient: Moi Carson MRN: [id number] DUPONT:[birth year] Visit Information Date & Time Provider Department Dept. Phone Encounter #  
 3/26/2018 10:10 AM Patra Siemens L. Karn Diones, 3687 [facility] 219 [id number] Upcoming Health Maintenance Date Due DTaP/Tdap/Td series (1 - Tdap) 7/2/1972 FOBT Q 1 YEAR AGE 50-75 7/2/2001 ZOSTER VACCINE AGE 60> 5/2/2011 GLAUCOMA SCREENING Q2Y 7/2/2016 Pneumococcal 65+ Low/Medium Risk (1 of 2 - PCV13) 7/2/2016 Influenza Age 5 to Adult 8/1/2017 MEDICARE YEARLY EXAM 3/14/2018 Allergies as of 3/26/2018  Review Complete On: 3/26/2018 By: Tasha Canales No Known Allergies Current Immunizations  Never Reviewed No immunizations on file. Not reviewed this visit You Were Diagnosed With   
  
 Codes Comments Chronic hepatitis C without hepatic coma (HCC)    -  Primary ICD-10-CM: B18.2 ICD-9-CM: 070.54 Liver mass     ICD-10-CM: R16.0 ICD-9-CM: 573.9 Vitals BP Pulse Temp Weight(growth percentile) SpO2 BMI  
 153/86 (BP 1 Location: Left arm, BP Patient Position: Sitting) (!) 58 98 °F (36.7 °C) (Tympanic) 146 lb (66.2 kg) 100% 20.95 kg/m2 Smoking Status Current Some Day Smoker BMI and BSA Data Body Mass Index Body Surface Area  
 20.95 kg/m 2 1.81 m 2 Your Updated Medication List  
  
   
This list is accurate as of 3/26/18 10:46 AM.  Always use your most recent med list.  
  
  
  
  
 ledipasvir-sofosbuvir  mg Tab Commonly known as:  Ellan Corpus Take  mg by mouth daily. Indications: CHRONIC HEPATITIS C - GENOTYPE 1, 1B, cirrhosis, treatment naive We Performed the Following CBC W/O DIFF [88879 CPT(R)] HCV RNA BY NICHOLAS QL,RFLX TO QT [93973 CPT(R)] HEPATIC FUNCTION PANEL (6) [TRE726500 Custom] METABOLIC PANEL, BASIC [50170 CPT(R)] To-Do List   
 04/13/2018 Imaging:  MRI ABD W MRCP W WO CONT Introducing [facility]! James Licona introduces [employer] patient portal. Now you can access parts of your medical record, email your doctor's office, and request medication refills online. 1. In your internet browser, go to [email or web address] 2. Click on the First Time User? Click Here link in the Sign In box. You will see the New Member Sign Up page. 3. Enter your [employer] Access Code exactly as it appears below. You will not need to use this code after youve completed the sign-up process. If you do not sign up before the expiration date, you must request a new code. · [employer] Access Code: [email or web address] Expires: 6/24/2018 10:46 AM 
 
4. Enter the last four digits of your Social Security Number (xxxx) and Date of Birth (mm/dd/yyyy) as indicated and click Submit. You will be taken to the next sign-up page. 5. Create a [employer] ID. This will be your [employer] login ID and cannot be changed, so think of one that is secure and easy to remember. 6. Create a [employer] password. You can change your password at any time. 7. Enter your Password Reset Question and Answer. This can be used at a later time if you forget your password. 8. Enter your e-mail address. You will receive e-mail notification when new information is available in [address] 9. Click Sign Up. You can now view and download portions of your medical record. 10. Click the Download Summary menu link to download a portable copy of your medical information. If you have questions, please visit the Frequently Asked Questions section of the [employer] website. Remember, [employer] is NOT to be used for urgent needs. For medical emergencies, dial 911. Now available from your iPhone and Android! Please provide this summary of care documentation to your next provider. Your primary care clinician is listed as Nicolle Haro. If you have any questions after today's visit, please call [phone].

## 2018-03-27 LAB
ALBUMIN SERPL-MCNC: 3.7 G/DL (ref 3.6–4.8)
ALP SERPL-CCNC: 75 IU/L (ref 39–117)
ALT SERPL-CCNC: 42 IU/L (ref 0–44)
AST SERPL-CCNC: 44 IU/L (ref 0–40)
BILIRUB DIRECT SERPL-MCNC: 0.19 MG/DL (ref 0–0.4)
BILIRUB SERPL-MCNC: 0.5 MG/DL (ref 0–1.2)
BUN SERPL-MCNC: 9 MG/DL (ref 8–27)
BUN/CREAT SERPL: 11 (ref 10–24)
CALCIUM SERPL-MCNC: 9.1 MG/DL (ref 8.6–10.2)
CHLORIDE SERPL-SCNC: 102 MMOL/L (ref 96–106)
CO2 SERPL-SCNC: 29 MMOL/L (ref 18–29)
CREAT SERPL-MCNC: 0.8 MG/DL (ref 0.76–1.27)
ERYTHROCYTE [DISTWIDTH] IN BLOOD BY AUTOMATED COUNT: 13.2 % (ref 12.3–15.4)
GFR SERPLBLD CREATININE-BSD FMLA CKD-EPI: 108 ML/MIN/1.73
GFR SERPLBLD CREATININE-BSD FMLA CKD-EPI: 93 ML/MIN/1.73
GLUCOSE SERPL-MCNC: 91 MG/DL (ref 65–99)
HCT VFR BLD AUTO: 47.8 % (ref 37.5–51)
HGB BLD-MCNC: 15.8 G/DL (ref 13–17.7)
MCH RBC QN AUTO: 31.2 PG (ref 26.6–33)
MCHC RBC AUTO-ENTMCNC: 33.1 G/DL (ref 31.5–35.7)
MCV RBC AUTO: 94 FL (ref 79–97)
PLATELET # BLD AUTO: 233 X10E3/UL (ref 150–379)
POTASSIUM SERPL-SCNC: 4.6 MMOL/L (ref 3.5–5.2)
RBC # BLD AUTO: 5.07 X10E6/UL (ref 4.14–5.8)
SODIUM SERPL-SCNC: 142 MMOL/L (ref 134–144)
WBC # BLD AUTO: 5 X10E3/UL (ref 3.4–10.8)

## 2018-03-29 LAB
HCV RNA SERPL NAA+PROBE-ACNC: NORMAL IU/ML
HCV RNA SERPL NAA+PROBE-LOG IU: 4.24 LOG10 IU/ML
HCV RNA SERPL QL NAA+PROBE: POSITIVE
TEST INFORMATION: NORMAL

## 2018-03-30 NOTE — PROGRESS NOTES
Virus still present but down. Pt was not taking it consistently. Left VM for pt. Gave results and told him to make sure he is taking it every day.

## 2018-05-21 ENCOUNTER — OFFICE VISIT (OUTPATIENT)
Dept: HEMATOLOGY | Age: 68
End: 2018-05-21

## 2018-05-21 VITALS
OXYGEN SATURATION: 97 % | TEMPERATURE: 97.5 F | HEART RATE: 54 BPM | WEIGHT: 146.4 LBS | HEIGHT: 70 IN | BODY MASS INDEX: 20.96 KG/M2 | SYSTOLIC BLOOD PRESSURE: 139 MMHG | DIASTOLIC BLOOD PRESSURE: 74 MMHG

## 2018-05-21 DIAGNOSIS — B18.2 CHRONIC HEPATITIS C WITHOUT HEPATIC COMA (HCC): Primary | ICD-10-CM

## 2018-05-21 NOTE — PROGRESS NOTES
[address] MD Ap, [address], [place], [place]       ALEXANDRO Rey PA-C [employer], ALEXANDRO Mancera NP        at [address]     217 [facility], [address] [place] [address]  22.     [phone]     FAX: [phone]    at [facility], [address] [place] [address], [address]     [phone]     FAX: [phone]     Patient Care Team:  Griselda Griffiths MD as PCP - General (Family Practice)    Problem List  Date Reviewed: 3/26/2018          Codes Class Noted    Chronic hepatitis C without hepatic coma [facility]) ICD-10-CM: B18.2  ICD-9-CM: 070.54  8/4/2017        Liver mass ICD-10-CM: R16.0  ICD-9-CM: 573.9  8/4/2017        Cirrhosis ([facility] [email or web address] 75.) ICD-10-CM: K74.60  ICD-9-CM: 571.5  8/4/2017        Hypertension ICD-10-CM: I10  ICD-9-CM: 401.9  8/4/2017        Gallstones ICD-10-CM: K80.20  ICD-9-CM: 574.20  8/4/2017            Taina Peña returns to the [address] for management of chronic HCV. The active problem list, all pertinent past medical history, medications, radiologic findings and laboratory findings related to the liver disorder were reviewed with the patient. The patient is a 78 y.o. Black male who tested positive for chronic HCV in 2010. MRI of the liver was performed at [facility] in 5/2017. The results of the imaging suggested cirrhosis with a liver mass concerning for HCC vs dysplastic nodule LIRADs-3. Assessment of liver fibrosis was performed with FibroScan. The result was 15.1 kPa which correlates with cirrhosis. The most recent imaging of the liver was MRI performed in 9/2017. Results suggest chronic liver disease. No defined liver mass was identified. The patient states he has been taking the medication every night but still has 1 bottle of medicine left. He should be done by now or pretty close.      The most recent laboratory studies indicate the liver transaminases are elevated, alkaline phosphatase is normal, tests of hepatic synthetic and metabolic function are normal, and the platelet count is normal.      The patient notes fatigue. The patient has not experienced pain in the right side over the liver, problems concentrating, swelling of the abdomen, swelling of the lower extremities, hematemesis, hematochezia. The patient completes all daily activities without any functional limitations. ALLERGIES  No Known Allergies    MEDICATIONS  Current Outpatient Prescriptions   Medication Sig    ledipasvir-sofosbuvir (HARVONI)  mg tab Take  mg by mouth daily. Indications: CHRONIC HEPATITIS C - GENOTYPE 1, 1B, cirrhosis, treatment naive     No current facility-administered medications for this visit. SYSTEM REVIEW NOT RELATED TO LIVER DISEASE OR REVIEWED ABOVE:  Constitution systems: Negative for fever, chills, weight gain, weight loss. Eyes: Negative for visual changes. ENT: Negative for sore throat, painful swallowing. Respiratory: Negative for cough, hemoptysis, SOB. Cardiology: Negative for chest pain, palpitations. GI:  Negative for constipation or diarrhea. : Negative for urinary frequency, dysuria, hematuria, nocturia. Skin: Negative for rash. Hematology: Negative for easy bruising, blood clots. Musculo-skeletal: Negative for back pain, muscle pain, weakness. Neurologic: Negative for headaches, dizziness, vertigo, memory problems not related to HE. Psychology: Negative for anxiety, depression. FAMILY HISTORY:  The father  of unknown cause. The mother is alive and healthy. There is no family history of liver disease. SOCIAL HISTORY:  The patient has never been . The patient has 1 child. The patient currently smokes half pack of tobacco daily. The patient consumes 3 alcoholic beverages per day. The patient used to work .   The patient has not worked since 19. PHYSICAL EXAMINATION:  Visit Vitals    /74 (BP 1 Location: Left arm, BP Patient Position: Sitting)    Pulse (!) 54    Temp 97.5 °F (36.4 °C) (Tympanic)    Ht 5' 10\" (1.778 m)    Wt 146 lb 6.4 oz (66.4 kg)    SpO2 97%    BMI 21.01 kg/m2     General: No acute distress. Eyes: Sclera anicteric. ENT: No oral lesions. Nodes: No adenopathy. Skin: No spider angiomata. No jaundice. No palmar erythema. Respiratory: Lungs clear to auscultation. Cardiovascular: Regular heart rate. No murmurs. No JVD. Abdomen: Soft non-tender. Liver size normal to percussion/palpation. Spleen not palpable. No obvious ascites. Extremities: No edema. No muscle wasting. No gross arthritic changes. Neurologic: Alert and oriented. Cranial nerves grossly intact. No asterixis. LABORATORY STUDIES:  Liver [place] of [address] Units 3/26/2018 8/4/2017 3/17/2015   WBC 3.4 - 10.8 x10E3/uL 5.0 8.0 8.9   ANC 1.4 - 7.0 x10E3/uL  2.3 4.3   HGB 13.0 - 17.7 g/dL 15.8 17.5 15.2    - 379 x10E3/uL 233 252 238   INR 0.8 - 1.2  1.1    AST 0 - 40 IU/L 44 (H) 123 (H)    ALT 0 - 44 IU/L 42 114 (H)    Alk Phos 39 - 117 IU/L 75 95    Bili, Total 0.0 - 1.2 mg/dL 0.5 0.7    Bili, Direct 0.00 - 0.40 mg/dL 0.19 0.30    Albumin 3.6 - 4.8 g/dL 3.7 4.0    BUN 8 - 27 mg/dL 9 7 (L)    Creat 0.76 - 1.27 mg/dL 0.80 0.76    Na 134 - 144 mmol/L 142 143    K 3.5 - 5.2 mmol/L 4.6 4.8    Cl 96 - 106 mmol/L 102 100    CO2 18 - 29 mmol/L 29 25    Glucose 65 - 99 mg/dL 91 91        SEROLOGIES:  Serologies Latest Ref Rng & Units 3/26/2018 8/4/2017   Hep A Ab, Total Negative  Positive (A)   Hep B Surface Ag Negative  Negative   Hep B Core Ab, Total Negative  Positive (A)   Hep B Surface AB QL   Reactive   Hep C Genotype   1b   HCV RT-PCR, Quant IU/mL [id number]     LIVER HISTOLOGY:  10/2017. [facility]Scan [facility] at [facility]. EkPa was 15.1. IQR/med 9%.  The results suggested a fibrosis level of F3-4. ENDOSCOPIC PROCEDURES:  Not available or performed    RADIOLOGY:  5/2017. Dynamic MRI at [facility]. Changes consistent with cirrhosis. 2.2 x 2.9 cm lesion left lobe with enhancement and washout but no rim enhancement. Consistent with dysplastic nodule. LIRADS-3  9/2017. Dynamic MRI of liver. Normal appearing liver. No evidence of cirrhosis. No liver mass lesions. Normal spleen. No dilated bile ducts. No bile duct strictures. No ascites. OTHER TESTING:  Not available or performed    ASSESSMENT AND PLAN:  Chronic HCV with stage 3 advanced fibrosis/cirrhosis. Liver function is normal.  The platelet count is normal.      An MRI at [facility] in 5/2017 suggested a liver mass LIRADS-3. Repeat MRI at [facility] in 9/2017 did not suggest cirrhosis and no definitive mass was seen. The patient has not gotten the repeat MRI which was ordered last visit. Will provide him with radiology number to schedule. The patient was directed to continue all current medications at the current dosages. There are no contraindications for the patient to take any medications that are necessary for treatment of other medical issues. The patient was counseled regarding alcohol consumption. Vaccination for viral hepatitis A and B is not needed. The patient has serologic evidence of prior exposure or vaccination with immunity. All of the above issues were discussed with the patient. All questions were answered. The patient expressed a clear understanding of the above. [facility] 87 in 5 weeks and hopefully, he will have completed therapy.      RYAN Spring-BC  Liver [place] of [address], [id number] [place] [address], [facility] 22. [phone]

## 2018-05-21 NOTE — PROGRESS NOTES
Chief Complaint   Patient presents with    Follow-up     Visit Vitals    /74 (BP 1 Location: Left arm, BP Patient Position: Sitting)    Pulse (!) 54    Temp 97.5 °F (36.4 °C) (Tympanic)    Ht 5' 10\" (1.778 m)    Wt 146 lb 6.4 oz (66.4 kg)    SpO2 97%    BMI 21.01 kg/m2     PHQ over the last two weeks 3/26/2018   Little interest or pleasure in doing things Not at all   Feeling down, depressed or hopeless Not at all   Total Score PHQ 2 0     1. Have you been to the ER, urgent care clinic since your last visit? Hospitalized since your last visit? 2. Have you seen or consulted any other health care providers outside of the [facility] since your last visit? Include any pap smears or colon screening. No    Abuse Screening Questionnaire 5/21/2018   Do you ever feel afraid of your partner? N   Are you in a relationship with someone who physically or mentally threatens you? N   Is it safe for you to go home?  Rosendo Median

## 2018-05-21 NOTE — MR AVS SNAPSHOT
1111 [facility] [phone] [address] [place] [address] 
[phone] Patient: Paris [place] MRN: [id number] WFL:[birth year] Visit Information Date & Time Provider Department Dept. Phone Encounter #  
 5/21/2018  8:45 AM Quinn Rubi, 3687 [facility] 219 [id number] Follow-up Instructions Return in about 5 weeks (around 6/25/2018) for gordo BUTCHER. Routing History Upcoming Health Maintenance Date Due DTaP/Tdap/Td series (1 - Tdap) 7/2/1972 FOBT Q 1 YEAR AGE 50-75 7/2/2001 ZOSTER VACCINE AGE 60> 5/2/2011 GLAUCOMA SCREENING Q2Y 7/2/2016 Pneumococcal 65+ Low/Medium Risk (1 of 2 - PCV13) 7/2/2016 MEDICARE YEARLY EXAM 3/14/2018 Influenza Age 5 to Adult 8/1/2018 Allergies as of 5/21/2018  Review Complete On: 5/21/2018 By: Moraima Negron. Deepthi Rubi, ALEXANDRO No Known Allergies Current Immunizations  Never Reviewed No immunizations on file. Not reviewed this visit You Were Diagnosed With   
  
 Codes Comments Chronic hepatitis C without hepatic coma (HCC)    -  Primary ICD-10-CM: B18.2 ICD-9-CM: 070.54 Vitals BP Pulse Temp Height(growth percentile) 139/74 (BP 1 Location: Left arm, BP Patient Position: Sitting) (!) 54 97.5 °F (36.4 °C) (Tympanic) 5' 10\" (1.778 m) Weight(growth percentile) SpO2 BMI Smoking Status 146 lb 6.4 oz (66.4 kg) 97% 21.01 kg/m2 Current Some Day Smoker BMI and BSA Data Body Mass Index Body Surface Area 21.01 kg/m 2 1.81 m 2 Your Updated Medication List  
  
   
This list is accurate as of 5/21/18  9:12 AM.  Always use your most recent med list.  
  
  
  
  
 ledipasvir-sofosbuvir  mg Tab Commonly known as:  Jenny Chris Take  mg by mouth daily. Indications: CHRONIC HEPATITIS C - GENOTYPE 1, 1B, cirrhosis, treatment naive We Performed the Following AFP WITH AFP-L3% [[id number] Custom] CBC W/O DIFF [05146 CPT(R)] HCV RNA BY NICHOLAS QL,RFLX TO QT [39173 CPT(R)] HEPATIC FUNCTION PANEL [63562 CPT(R)] METABOLIC PANEL, BASIC [18068 CPT(R)] PROTHROMBIN TIME + INR [87230 CPT(R)] Follow-up Instructions Return in about 5 weeks (around 6/25/2018) for gordo BUTCHER. Introducing hospitals & HEALTH SERVICES! [facility] introduces "[employer]" patient portal. Now you can access parts of your medical record, email your doctor's office, and request medication refills online. 1. In your internet browser, go to [email or web address] 2. Click on the First Time User? Click Here link in the Sign In box. You will see the New Member Sign Up page. 3. Enter your "[employer]" Access Code exactly as it appears below. You will not need to use this code after youve completed the sign-up process. If you do not sign up before the expiration date, you must request a new code. · "[employer]" Access Code: [email or web address] Expires: 6/24/2018 10:46 AM 
 
4. Enter the last four digits of your Social Security Number (xxxx) and Date of Birth (mm/dd/yyyy) as indicated and click Submit. You will be taken to the next sign-up page. 5. Create a "[employer]" ID. This will be your "[employer]" login ID and cannot be changed, so think of one that is secure and easy to remember. 6. Create a "[employer]" password. You can change your password at any time. 7. Enter your Password Reset Question and Answer. This can be used at a later time if you forget your password. 8. Enter your e-mail address. You will receive e-mail notification when new information is available in [address] 9. Click Sign Up. You can now view and download portions of your medical record. 10. Click the Download Summary menu link to download a portable copy of your medical information. If you have questions, please visit the Frequently Asked Questions section of the "[employer]" website.  Remember, "[employer]" is NOT to be used for urgent needs. For medical emergencies, dial 911. Now available from your iPhone and Android! Please provide this summary of care documentation to your next provider. Your primary care clinician is listed as Fredick Seay. If you have any questions after today's visit, please call [phone].

## 2018-05-22 LAB
AFP L3 MFR SERPL: 9.3 % (ref 0–9.9)
AFP SERPL-MCNC: 11.3 NG/ML (ref 0–8)
ALBUMIN SERPL-MCNC: 4.3 G/DL (ref 3.6–4.8)
ALP SERPL-CCNC: 73 IU/L (ref 39–117)
ALT SERPL-CCNC: 26 IU/L (ref 0–44)
AST SERPL-CCNC: 31 IU/L (ref 0–40)
BILIRUB DIRECT SERPL-MCNC: 0.25 MG/DL (ref 0–0.4)
BILIRUB SERPL-MCNC: 0.9 MG/DL (ref 0–1.2)
BUN SERPL-MCNC: 8 MG/DL (ref 8–27)
BUN/CREAT SERPL: 8 (ref 10–24)
CALCIUM SERPL-MCNC: 9.8 MG/DL (ref 8.6–10.2)
CHLORIDE SERPL-SCNC: 101 MMOL/L (ref 96–106)
CO2 SERPL-SCNC: 26 MMOL/L (ref 18–29)
CREAT SERPL-MCNC: 1.05 MG/DL (ref 0.76–1.27)
ERYTHROCYTE [DISTWIDTH] IN BLOOD BY AUTOMATED COUNT: 13.4 % (ref 12.3–15.4)
GFR SERPLBLD CREATININE-BSD FMLA CKD-EPI: 74 ML/MIN/1.73
GFR SERPLBLD CREATININE-BSD FMLA CKD-EPI: 85 ML/MIN/1.73
GLUCOSE SERPL-MCNC: 77 MG/DL (ref 65–99)
HCT VFR BLD AUTO: 51.7 % (ref 37.5–51)
HCV RNA SERPL QL NAA+PROBE: NEGATIVE
HGB BLD-MCNC: 17.3 G/DL (ref 13–17.7)
INR PPP: 1.1 (ref 0.8–1.2)
MCH RBC QN AUTO: 31.6 PG (ref 26.6–33)
MCHC RBC AUTO-ENTMCNC: 33.5 G/DL (ref 31.5–35.7)
MCV RBC AUTO: 94 FL (ref 79–97)
PLATELET # BLD AUTO: 293 X10E3/UL (ref 150–379)
POTASSIUM SERPL-SCNC: 4.2 MMOL/L (ref 3.5–5.2)
PROT SERPL-MCNC: 7.1 G/DL (ref 6–8.5)
PROTHROMBIN TIME: 11.4 SEC (ref 9.1–12)
RBC # BLD AUTO: 5.48 X10E6/UL (ref 4.14–5.8)
SODIUM SERPL-SCNC: 145 MMOL/L (ref 134–144)
WBC # BLD AUTO: 8.4 X10E3/UL (ref 3.4–10.8)

## 2018-05-24 NOTE — PROGRESS NOTES
Called pt and spoke with him. Having issues getting the MRI at [facility], even though it was done there before. He has no allergies or renal insufficiencies which would make him unable to get the gandolinium. Patient was very excited about negative viral load and he verbalized understanding to continue taking all of the medication. Hopefully he will have a sustained response despite inconsistent medication adherence. Follow up as scheduled.

## 2018-06-23 ENCOUNTER — HOSPITAL ENCOUNTER (OUTPATIENT)
Dept: MRI IMAGING | Age: 68
Discharge: HOME | End: 2018-06-23
Attending: NURSE PRACTITIONER
Payer: MEDICARE

## 2018-06-23 DIAGNOSIS — R16.0 LIVER MASS: ICD-10-CM

## 2018-06-23 PROCEDURE — 74011000258 HC RX REV CODE- 258: Performed by: NURSE PRACTITIONER

## 2018-06-23 PROCEDURE — 74183 MRI ABD W/O CNTR FLWD CNTR: CPT

## 2018-06-23 PROCEDURE — 74011250636 HC RX REV CODE- 250/636: Performed by: NURSE PRACTITIONER

## 2018-06-23 PROCEDURE — A9585 GADOBUTROL INJECTION: HCPCS | Performed by: NURSE PRACTITIONER

## 2018-06-23 RX ORDER — SODIUM CHLORIDE 0.9 % (FLUSH) 0.9 %
10 SYRINGE (ML) INJECTION
Status: COMPLETED | OUTPATIENT
Start: 2018-06-23 | End: 2018-06-23

## 2018-06-23 RX ADMIN — SODIUM CHLORIDE 100 ML: 900 INJECTION, SOLUTION INTRAVENOUS at 12:00

## 2018-06-23 RX ADMIN — Medication 10 ML: at 12:00

## 2018-06-23 RX ADMIN — GADOBUTROL 7.5 ML: 604.72 INJECTION INTRAVENOUS at 12:00

## 2018-07-12 ENCOUNTER — OFFICE VISIT (OUTPATIENT)
Dept: HEMATOLOGY | Age: 68
End: 2018-07-12

## 2018-07-12 VITALS
DIASTOLIC BLOOD PRESSURE: 87 MMHG | WEIGHT: 150.6 LBS | OXYGEN SATURATION: 97 % | SYSTOLIC BLOOD PRESSURE: 152 MMHG | TEMPERATURE: 97.9 F | HEIGHT: 70 IN | BODY MASS INDEX: 21.56 KG/M2 | HEART RATE: 59 BPM

## 2018-07-12 DIAGNOSIS — B18.2 CHRONIC HEPATITIS C WITHOUT HEPATIC COMA (HCC): Primary | ICD-10-CM

## 2018-07-12 NOTE — PROGRESS NOTES
Chief Complaint   Patient presents with    Follow-up     Visit Vitals    /87 (BP 1 Location: Left arm, BP Patient Position: Sitting)    Pulse (!) 59    Temp 97.9 °F (36.6 °C) (Tympanic)    Ht 5' 10\" (1.778 m)    Wt 150 lb 9.6 oz (68.3 kg)    SpO2 97%    BMI 21.61 kg/m2     PHQ over the last two weeks 3/26/2018   Little interest or pleasure in doing things Not at all   Feeling down, depressed or hopeless Not at all   Total Score PHQ 2 0     1. Have you been to the ER, urgent care clinic since your last visit? Hospitalized since your last visit? No    2. Have you seen or consulted any other health care providers outside of the [address] [place] [address] since your last visit? Include any pap smears or colon screening.  No

## 2018-07-12 NOTE — MR AVS SNAPSHOT
2700 [facility] [phone] [address] [place] [address] 
[phone] Patient: Semaj Carlos MRN: [id number] ETN:[id number] Visit Information Date & Time Provider Department Dept. Phone Encounter #  
 7/12/2018 11:00 AM Zully Hernandez, 3687 [facility] 219 [id number] Your Appointments 10/15/2018 11:00 AM  
Follow Up with ALEXANDRO Farooq 75 ([facility] Appt Note: Follow up 200 [facility] [phone] [facility] [address]  
59 [facility] Jason [address] Upcoming Health Maintenance Date Due DTaP/Tdap/Td series (1 - Tdap) 7/2/1972 FOBT Q 1 YEAR AGE 50-75 7/2/2001 ZOSTER VACCINE AGE 60> 5/2/2011 GLAUCOMA SCREENING Q2Y 7/2/2016 Pneumococcal 65+ Low/Medium Risk (1 of 2 - PCV13) 7/2/2016 Influenza Age 5 to Adult 8/1/2018 Allergies as of 7/12/2018  Review Complete On: 7/12/2018 By: Renetta Jacobo LPN No Known Allergies Current Immunizations  Never Reviewed No immunizations on file. Not reviewed this visit You Were Diagnosed With   
  
 Codes Comments Chronic hepatitis C without hepatic coma (HCC)    -  Primary ICD-10-CM: B18.2 ICD-9-CM: 070.54 Vitals BP Pulse Temp Height(growth percentile) 152/87 (BP 1 Location: Left arm, BP Patient Position: Sitting) (!) 59 97.9 °F (36.6 °C) (Tympanic) 5' 10\" (1.778 m) Weight(growth percentile) SpO2 BMI Smoking Status 150 lb 9.6 oz (68.3 kg) 97% 21.61 kg/m2 Current Some Day Smoker Vitals History BMI and BSA Data Body Mass Index Body Surface Area  
 21.61 kg/m 2 1.84 m 2 Your Updated Medication List  
  
   
This list is accurate as of 7/12/18 11:09 AM.  Always use your most recent med list.  
  
  
  
  
 ledipasvir-sofosbuvir  mg Tab Commonly known as:  [place] Take  mg by mouth daily. Indications: CHRONIC HEPATITIS C - GENOTYPE 1, 1B, cirrhosis, treatment naive We Performed the Following CBC W/O DIFF [12170 CPT(R)] HCV RNA BY NICHOLAS QL,RFLX TO QT [84268 CPT(R)] HEPATIC FUNCTION PANEL [76379 CPT(R)] METABOLIC PANEL, BASIC [64168 CPT(R)] PROTHROMBIN TIME + INR [47797 CPT(R)] Introducing [facility] & HEALTH SERVICES! [employer] introduces [employer] patient portal. Now you can access parts of your medical record, email your doctor's office, and request medication refills online. 1. In your internet browser, go to [email or web address] 2. Click on the First Time User? Click Here link in the Sign In box. You will see the New Member Sign Up page. 3. Enter your Where[employer] Access Code exactly as it appears below. You will not need to use this code after youve completed the sign-up process. If you do not sign up before the expiration date, you must request a new code. · Where[employer] Access Code: [email or web address] Expires: 10/10/2018 11:09 AM 
 
4. Enter the last four digits of your Social Security Number (xxxx) and Date of Birth (mm/dd/yyyy) as indicated and click Submit. You will be taken to the next sign-up page. 5. Create a Whereoscope ID. This will be your Whereoscope login ID and cannot be changed, so think of one that is secure and easy to remember. 6. Create a Where[employer] password. You can change your password at any time. 7. Enter your Password Reset Question and Answer. This can be used at a later time if you forget your password. 8. Enter your e-mail address. You will receive e-mail notification when new information is available in [address] 9. Click Sign Up. You can now view and download portions of your medical record. 10. Click the Download Summary menu link to download a portable copy of your medical information.  
 
If you have questions, please visit the Frequently Asked Questions section of the [employer]. Remember, [employer] is NOT to be used for urgent needs. For medical emergencies, dial 911. Now available from your iPhone and Android! Please provide this summary of care documentation to your next provider. Your primary care clinician is listed as Julee Jackson. If you have any questions after today's visit, please call [phone].

## 2018-07-12 NOTE — PROGRESS NOTES
[address] Jani De Souza MD, [address], [facility], [place]       Keily Mcdonald, GABRIELA Lieberman NP Bea Isles, NP        at [facility] AT [address], [address] [place]  22.     [phone]     FAX: [phone]    at [address], [address], [address]     [phone]     FAX: [phone]     Patient Care Team:  Dallas Loaiza MD as PCP - General (Family Practice)    Problem List  Date Reviewed: 7/12/2018          Codes Class Noted    Chronic hepatitis C without hepatic coma [facility]) ICD-10-CM: B18.2  ICD-9-CM: 070.54  8/4/2017        Liver mass ICD-10-CM: R16.0  ICD-9-CM: 573.9  8/4/2017        Cirrhosis ([facility] ICD-10-CM: K74.60  ICD-9-CM: 571.5  8/4/2017        Hypertension ICD-10-CM: I10  ICD-9-CM: 401.9  8/4/2017        Gallstones ICD-10-CM: K80.20  ICD-9-CM: 574.20  8/4/2017            Felix Collins returns to the [facility] for management of chronic HCV. The active problem list, all pertinent past medical history, medications, radiologic findings and laboratory findings related to the liver disorder were reviewed with the patient. The patient is a 80 y.o. Black male who tested positive for chronic HCV in 2010. MRI of the liver was performed at [facility] in 5/2017. The results of the imaging suggested cirrhosis with a liver mass concerning for HCC vs dysplastic nodule LIRADs-3. Assessment of liver fibrosis was performed with FibroScan. The result was 15.1 kPa which correlates with cirrhosis. The most recent imaging of the liver was MRI performed in 9/2017. Results suggest chronic liver disease. No defined liver mass was identified. The patient states he has completed all medication.     The most recent laboratory studies indicate the liver transaminases are elevated, alkaline phosphatase is normal, tests of hepatic synthetic and metabolic function are normal, and the platelet count is normal.      The patient notes fatigue. The patient has not experienced pain in the right side over the liver, problems concentrating, swelling of the abdomen, swelling of the lower extremities, hematemesis, hematochezia. The patient completes all daily activities without any functional limitations. ALLERGIES  No Known Allergies    MEDICATIONS  No current outpatient prescriptions on file. No current facility-administered medications for this visit. SYSTEM REVIEW NOT RELATED TO LIVER DISEASE OR REVIEWED ABOVE:  Constitution systems: Negative for fever, chills, weight gain, weight loss. Eyes: Negative for visual changes. ENT: Negative for sore throat, painful swallowing. Respiratory: Negative for cough, hemoptysis, SOB. Cardiology: Negative for chest pain, palpitations. GI:  Negative for constipation or diarrhea. : Negative for urinary frequency, dysuria, hematuria, nocturia. Skin: Negative for rash. Hematology: Negative for easy bruising, blood clots. Musculo-skeletal: Negative for back pain, muscle pain, weakness. Neurologic: Negative for headaches, dizziness, vertigo, memory problems not related to HE. Psychology: Negative for anxiety, depression. FAMILY HISTORY:  The father  of unknown cause. The mother is alive and healthy. There is no family history of liver disease. SOCIAL HISTORY:  The patient has never been . The patient has 1 child. The patient currently smokes half pack of tobacco daily. The patient consumes 3 alcoholic beverages per day. The patient used to work . The patient has not worked since [address] [place] [address].       PHYSICAL EXAMINATION:  Visit Vitals    /87 (BP 1 Location: Left arm, BP Patient Position: Sitting)    Pulse (!) 59    Temp 97.9 °F (36.6 °C) (Tympanic)    Ht 5' 10\" (1.778 m)    Wt 150 lb 9.6 oz (68.3 kg)    SpO2 97%    BMI 21.61 kg/m2     General: No acute distress. Eyes: Sclera anicteric. ENT: No oral lesions. Nodes: No adenopathy. Skin: No spider angiomata. No jaundice. No palmar erythema. Respiratory: Lungs clear to auscultation. Cardiovascular: Regular heart rate. No murmurs. No JVD. Abdomen: Soft non-tender. Liver size normal to percussion/palpation. Spleen not palpable. No obvious ascites. Extremities: No edema. No muscle wasting. No gross arthritic changes. Neurologic: Alert and oriented. Cranial nerves grossly intact. No asterixis. LABORATORY STUDIES:  Liver [place] [address] Units 3/26/2018 8/4/2017 3/17/2015   WBC 3.4 - 10.8 x10E3/uL 5.0 8.0 8.9   ANC 1.4 - 7.0 x10E3/uL  2.3 4.3   HGB 13.0 - 17.7 g/dL 15.8 17.5 15.2    - 379 x10E3/uL 233 252 238   INR 0.8 - 1.2  1.1    AST 0 - 40 IU/L 44 (H) 123 (H)    ALT 0 - 44 IU/L 42 114 (H)    Alk Phos 39 - 117 IU/L 75 95    Bili, Total 0.0 - 1.2 mg/dL 0.5 0.7    Bili, Direct 0.00 - 0.40 mg/dL 0.19 0.30    Albumin 3.6 - 4.8 g/dL 3.7 4.0    BUN 8 - 27 mg/dL 9 7 (L)    Creat 0.76 - 1.27 mg/dL 0.80 0.76    Na 134 - 144 mmol/L 142 143    K 3.5 - 5.2 mmol/L 4.6 4.8    Cl 96 - 106 mmol/L 102 100    CO2 18 - 29 mmol/L 29 25    Glucose 65 - 99 mg/dL 91 91      SEROLOGIES:  Serologies Latest Ref Rng & Units 3/26/2018 8/4/2017   Hep A Ab, Total Negative  Positive (A)   Hep B Surface Ag Negative  Negative   Hep B Core Ab, Total Negative  Positive (A)   Hep B Surface AB QL   Reactive   Hep C Genotype   1b   HCV RT-PCR, Quant IU/mL [id number]     LIVER HISTOLOGY:  10/2017. FibroScan performed at [address]. EkPa was 15.1. IQR/med 9%. The results suggested a fibrosis level of F3-4. ENDOSCOPIC PROCEDURES:  Not available or performed    RADIOLOGY:  6/2018. MRI/MRCP. 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    9/2017. Dynamic MRI of liver. Normal appearing liver. No evidence of cirrhosis. No liver mass lesions. Normal spleen. No dilated bile ducts. No bile duct strictures. No ascites. 5/2017. Dynamic MRI at [facility]. Changes consistent with cirrhosis. 2.2 x 2.9 cm lesion left lobe with enhancement and washout but no rim enhancement. Consistent with dysplastic nodule. LIRADS-3    OTHER TESTING:  Not available or performed    ASSESSMENT AND PLAN:  Chronic HCV with stage 3 advanced fibrosis/cirrhosis. Liver function is normal. The platelet count is normal.      An MRI at [facility] in 5/2017 suggested a liver mass LIRADS-3. Repeat MRI at [facility] in 9/2017 did not suggest cirrhosis and no definitive mass was seen. Now they are calling it a [facility]. The patient was directed to continue all current medications at the current dosages. There are no contraindications for the patient to take any medications that are necessary for treatment of other medical issues. The patient was counseled regarding alcohol consumption. Vaccination for viral hepatitis A and B is not needed. The patient has serologic evidence of prior exposure or vaccination with immunity. All of the above issues were discussed with the patient. All questions were answered. The patient expressed a clear understanding of the above. [facility] 87 in 3 months for SVR.     Alex Lopez, [facility] CenterNADINE-BC  Liver [place] of [address], [address] [place]  [address].  [phone]

## 2018-07-13 LAB
ALBUMIN SERPL-MCNC: 4.1 G/DL (ref 3.6–4.8)
ALP SERPL-CCNC: 82 IU/L (ref 39–117)
ALT SERPL-CCNC: 27 IU/L (ref 0–44)
AST SERPL-CCNC: 33 IU/L (ref 0–40)
BILIRUB DIRECT SERPL-MCNC: 0.27 MG/DL (ref 0–0.4)
BILIRUB SERPL-MCNC: 0.9 MG/DL (ref 0–1.2)
BUN SERPL-MCNC: 8 MG/DL (ref 8–27)
BUN/CREAT SERPL: 8 (ref 10–24)
CALCIUM SERPL-MCNC: 10 MG/DL (ref 8.6–10.2)
CHLORIDE SERPL-SCNC: 99 MMOL/L (ref 96–106)
CO2 SERPL-SCNC: 29 MMOL/L (ref 20–29)
CREAT SERPL-MCNC: 0.97 MG/DL (ref 0.76–1.27)
ERYTHROCYTE [DISTWIDTH] IN BLOOD BY AUTOMATED COUNT: 13.9 % (ref 12.3–15.4)
GLUCOSE SERPL-MCNC: 86 MG/DL (ref 65–99)
HCT VFR BLD AUTO: 49 % (ref 37.5–51)
HGB BLD-MCNC: 16.6 G/DL (ref 13–17.7)
INR PPP: 1.1 (ref 0.8–1.2)
MCH RBC QN AUTO: 31.9 PG (ref 26.6–33)
MCHC RBC AUTO-ENTMCNC: 33.9 G/DL (ref 31.5–35.7)
MCV RBC AUTO: 94 FL (ref 79–97)
PLATELET # BLD AUTO: 279 X10E3/UL (ref 150–379)
POTASSIUM SERPL-SCNC: 4.8 MMOL/L (ref 3.5–5.2)
PROT SERPL-MCNC: 6.8 G/DL (ref 6–8.5)
PROTHROMBIN TIME: 11.2 SEC (ref 9.1–12)
RBC # BLD AUTO: 5.2 X10E6/UL (ref 4.14–5.8)
SODIUM SERPL-SCNC: 142 MMOL/L (ref 134–144)
WBC # BLD AUTO: 8.4 X10E3/UL (ref 3.4–10.8)

## 2018-07-13 NOTE — PROGRESS NOTES
Advised of results. Waiting on HCV RNA but unlikely positive with normal enzymes. Will call when it comes in.

## 2018-07-17 LAB — HCV RNA SERPL QL NAA+PROBE: NEGATIVE

## 2018-11-12 ENCOUNTER — TELEPHONE (OUTPATIENT)
Dept: HEMATOLOGY | Age: 68
End: 2018-11-12

## 2018-11-12 NOTE — TELEPHONE ENCOUNTER
I called the patient to confirm his 11/14 appt with Tk Pompa NP. The patients mother answered the phone. I checked his chart and confirmed that I was able to give out PHI to his mother. Once confirmed I informed her of his 11/14 appt with Jack Holstein at 1:30 PM. She confirmed that the patient will be in attendance.     Karen Velasquez

## 2018-12-03 ENCOUNTER — OFFICE VISIT (OUTPATIENT)
Dept: HEMATOLOGY | Age: 68
End: 2018-12-03

## 2018-12-03 VITALS
TEMPERATURE: 97.4 F | DIASTOLIC BLOOD PRESSURE: 82 MMHG | WEIGHT: 151 LBS | HEART RATE: 49 BPM | HEIGHT: 70 IN | OXYGEN SATURATION: 99 % | BODY MASS INDEX: 21.62 KG/M2 | SYSTOLIC BLOOD PRESSURE: 156 MMHG

## 2018-12-03 DIAGNOSIS — B18.2 CHRONIC HEPATITIS C WITHOUT HEPATIC COMA (HCC): Primary | ICD-10-CM

## 2018-12-03 DIAGNOSIS — R16.0 LIVER MASS: ICD-10-CM

## 2018-12-03 LAB
ERYTHROCYTE [DISTWIDTH] IN BLOOD BY AUTOMATED COUNT: 13.1 % (ref 12.3–15.4)
HCT VFR BLD AUTO: 49.9 % (ref 37.5–51)
HGB BLD-MCNC: 17.2 G/DL (ref 13–17.7)
MCH RBC QN AUTO: 31.9 PG (ref 26.6–33)
MCHC RBC AUTO-ENTMCNC: 34.5 G/DL (ref 31.5–35.7)
MCV RBC AUTO: 92 FL (ref 79–97)
PLATELET # BLD AUTO: 284 X10E3/UL (ref 150–379)
RBC # BLD AUTO: 5.4 X10E6/UL (ref 4.14–5.8)
WBC # BLD AUTO: 7.6 X10E3/UL (ref 3.4–10.8)

## 2018-12-03 NOTE — PROGRESS NOTES
134 E Jani De Souza MD, [place], [facility], [place]       Anai Knowles, GABRIELA Lopez, ALEXANDRO Arzola, ALEXANDRO        at [address], [address] [place]  [address].     [phone]     FAX: [phone]    at [address], [address], [address]     [phone]     FAX: [phone]     Patient Care Team:  Juan Salazar MD as PCP - General (Family Practice)    Problem List  Date Reviewed: 7/12/2018          Codes Class Noted    Chronic hepatitis C without hepatic coma [facility]) ICD-10-CM: B18.2  ICD-9-CM: 070.54  8/4/2017        Liver mass ICD-10-CM: R16.0  ICD-9-CM: 573.9  8/4/2017        Cirrhosis ([facility] ICD-10-CM: K74.60  ICD-9-CM: 571.5  8/4/2017        Hypertension ICD-10-CM: I10  ICD-9-CM: 401.9  8/4/2017        Gallstones ICD-10-CM: K80.20  ICD-9-CM: 574.20  8/4/2017            Estela Valenzuela returns to the [facility] for management of chronic HCV. The active problem list, all pertinent past medical history, medications, radiologic findings and laboratory findings related to the liver disorder were reviewed with the patient. The patient is a 80 y.o. Black male who tested positive for chronic HCV in 2010. MRI of the liver was performed at [facility] in 5/2017. The results of the imaging suggested cirrhosis with a liver mass concerning for HCC vs dysplastic nodule LIRADs-3. Assessment of liver fibrosis was performed with FibroScan. The result was 15.1 kPa which correlates with cirrhosis. The most recent imaging of the liver was MRI performed in 9/2017. Results suggest chronic liver disease. No defined liver mass was identified. The patient states he has completed all medication.     The most recent laboratory studies indicate the liver transaminases are elevated, alkaline phosphatase is normal, tests of hepatic synthetic and metabolic function are normal, and the platelet count is normal.      The patient notes fatigue. The patient has not experienced pain in the right side over the liver, problems concentrating, swelling of the abdomen, swelling of the lower extremities, hematemesis, hematochezia. The patient completes all daily activities without any functional limitations. ALLERGIES  No Known Allergies    MEDICATIONS  No current outpatient medications on file. No current facility-administered medications for this visit. SYSTEM REVIEW NOT RELATED TO LIVER DISEASE OR REVIEWED ABOVE:  Constitution systems: Negative for fever, chills, weight gain, weight loss. Eyes: Negative for visual changes. ENT: Negative for sore throat, painful swallowing. Respiratory: Negative for cough, hemoptysis, SOB. Cardiology: Negative for chest pain, palpitations. GI:  Negative for constipation or diarrhea. : Negative for urinary frequency, dysuria, hematuria, nocturia. Skin: Negative for rash. Hematology: Negative for easy bruising, blood clots. Musculo-skeletal: Negative for back pain, muscle pain, weakness. Neurologic: Negative for headaches, dizziness, vertigo, memory problems not related to HE. Psychology: Negative for anxiety, depression. FAMILY HISTORY:  The father  of unknown cause. The mother is alive and healthy. There is no family history of liver disease. SOCIAL HISTORY:  The patient has never been . The patient has 1 child. The patient currently smokes half pack of tobacco daily. The patient consumes 3 alcoholic beverages per day. The patient used to work . The patient has not worked since [address] [place] [address].       PHYSICAL EXAMINATION:  Visit Vitals  /82 (BP 1 Location: Left arm, BP Patient Position: Sitting)   Pulse (!) 49   Temp 97.4 °F (36.3 °C) (Tympanic)   Ht 5' 10\" (1.778 m)   Wt 151 lb (68.5 kg)   SpO2 99%   BMI 21.67 kg/m²     General: No acute distress. Eyes: Sclera anicteric. ENT: No oral lesions. Nodes: No adenopathy. Skin: No spider angiomata. No jaundice. No palmar erythema. Respiratory: Lungs clear to auscultation. Cardiovascular: Regular heart rate. No murmurs. No JVD. Abdomen: Soft non-tender. Liver size normal to percussion/palpation. Spleen not palpable. No obvious ascites. Extremities: No edema. No muscle wasting. No gross arthritic changes. Neurologic: Alert and oriented. Cranial nerves grossly intact. No asterixis.     LABORATORY STUDIES:  [address] Units 7/12/2018 5/21/2018   WBC 3.4 - 10.8 x10E3/uL 8.4 8.4   ANC 1.4 - 7.0 x10E3/uL     HGB 13.0 - 17.7 g/dL 16.6 17.3    - 379 x10E3/uL 279 293   INR 0.8 - 1.2 1.1 1.1   AST 0 - 40 IU/L 33 31   ALT 0 - 44 IU/L 27 26   Alk Phos 39 - 117 IU/L 82 73   Bili, Total 0.0 - 1.2 mg/dL 0.9 0.9   Bili, Direct 0.00 - 0.40 mg/dL 0.27 0.25   Albumin 3.6 - 4.8 g/dL 4.1 4.3   BUN 8 - 27 mg/dL 8 8   Creat 0.76 - 1.27 mg/dL 0.97 1.05   Na 134 - 144 mmol/L 142 145 (H)   K 3.5 - 5.2 mmol/L 4.8 4.2   Cl 96 - 106 mmol/L 99 101   CO2 20 - 29 mmol/L 29 26   Glucose 65 - 99 mg/dL 86 77     Liver [facility] Latest Ref Rng & Units 3/26/2018   WBC 3.4 - 10.8 x10E3/uL 5.0   ANC 1.4 - 7.0 x10E3/uL    HGB 13.0 - 17.7 g/dL 15.8    - 379 x10E3/uL 233   INR 0.8 - 1.2    AST 0 - 40 IU/L 44 (H)   ALT 0 - 44 IU/L 42   Alk Phos 39 - 117 IU/L 75   Bili, Total 0.0 - 1.2 mg/dL 0.5   Bili, Direct 0.00 - 0.40 mg/dL 0.19   Albumin 3.6 - 4.8 g/dL 3.7   BUN 8 - 27 mg/dL 9   Creat 0.76 - 1.27 mg/dL 0.80   Na 134 - 144 mmol/L 142   K 3.5 - 5.2 mmol/L 4.6   Cl 96 - 106 mmol/L 102   CO2 20 - 29 mmol/L 29   Glucose 65 - 99 mg/dL 91     SEROLOGIES:  Serologies Latest Ref Rng & Units 3/26/2018 8/4/2017   Hep A Ab, Total Negative  Positive (A)   Hep B Surface Ag Negative  Negative   Hep B Core Ab, Total Negative  Positive (A)   Hep B Surface AB QL   Reactive Hep C Genotype   1b   HCV RT-PCR, Quant IU/mL [id number]     Virology Latest Ref Rng & Units 7/12/2018 5/21/2018 3/26/2018   HCV RNA, NICHOLAS, QL Negative Negative Negative Positive (A)     LIVER HISTOLOGY:  10/2017. [facility]. EkPa was 15.1. IQR/med 9%. The results suggested a fibrosis level of F3-4. ENDOSCOPIC PROCEDURES:  Not available or performed    RADIOLOGY:  6/2018. MRI/MRCP. 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    9/2017. Dynamic MRI of liver. Normal appearing liver. No evidence of cirrhosis. No liver mass lesions. Normal spleen. No dilated bile ducts. No bile duct strictures. No ascites. 5/2017. Dynamic MRI at [facility]. Changes consistent with cirrhosis. 2.2 x 2.9 cm lesion left lobe with enhancement and washout but no rim enhancement. Consistent with dysplastic nodule. LIRADS-3    OTHER TESTING:  Not available or performed    ASSESSMENT AND PLAN:  Chronic HCV with stage 3 advanced fibrosis/cirrhosis. Liver function is normal. The platelet count is normal.      An MRI at [facility] in 5/2017 suggested a liver mass LIRADS-3. Repeat MRI at [facility] in 9/2017 did not suggest cirrhosis and no definitive mass was seen. 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 Last read, radiology has recommended a dedicated liver MRI. I have ordered this today with special note in order about last read. The patient was directed to continue all current medications at the current dosages. There are no contraindications for the patient to take any medications that are necessary for treatment of other medical issues. The patient was counseled regarding alcohol consumption. Vaccination for viral hepatitis A and B is not needed. The patient has serologic evidence of prior exposure or vaccination with immunity. All of the above issues were discussed with the patient. All questions were answered.  The patient expressed a clear understanding of the above.    [facility] 87 in 6 months.      Venson Kehr, [facility] HospitalP-BC  Liver [place] of [facility] [address] [place], [address] [place]  [address] [facility] 22.  [phone]

## 2018-12-03 NOTE — PROGRESS NOTES
1. Have you been to the ER, urgent care clinic since your last visit? Hospitalized since your last visit? No    2. Have you seen or consulted any other health care providers outside of the [address] [place] [address] since your last visit? Include any pap smears or colon screening. No   Chief Complaint   Patient presents with    Follow-up     Visit Vitals  /82 (BP 1 Location: Left arm, BP Patient Position: Sitting)   Pulse (!) 49   Temp 97.4 °F (36.3 °C) (Tympanic)   Ht 5' 10\" (1.778 m)   Wt 151 lb (68.5 kg)   SpO2 99%   BMI 21.67 kg/m²     PHQ over the last two weeks 12/3/2018   Little interest or pleasure in doing things Not at all   Feeling down, depressed, irritable, or hopeless Not at all   Total Score PHQ 2 0     Fall Risk Assessment, last 12 mths 12/3/2018   Able to walk? Yes   Fall in past 12 months? No   Fall with injury? -   Number of falls in past 12 months -   Fall Risk Score -       Learning Assessment 12/3/2018   PRIMARY LEARNER Patient   BARRIERS PRIMARY LEARNER NONE   CO-LEARNER CAREGIVER No   PRIMARY LANGUAGE ENGLISH   LEARNER PREFERENCE PRIMARY LISTENING   ANSWERED BY patient   RELATIONSHIP SELF     Abuse Screening Questionnaire 12/3/2018   Do you ever feel afraid of your partner? N   Are you in a relationship with someone who physically or mentally threatens you? N   Is it safe for you to go home?  Mohti Sykes

## 2018-12-04 LAB
AFP L3 MFR SERPL: 5.7 % (ref 0–9.9)
AFP SERPL-MCNC: 8.8 NG/ML (ref 0–8)
ALBUMIN SERPL-MCNC: 4.5 G/DL (ref 3.6–4.8)
ALP SERPL-CCNC: 85 IU/L (ref 39–117)
ALT SERPL-CCNC: 25 IU/L (ref 0–44)
AST SERPL-CCNC: 31 IU/L (ref 0–40)
BILIRUB DIRECT SERPL-MCNC: 0.31 MG/DL (ref 0–0.4)
BILIRUB SERPL-MCNC: 1.2 MG/DL (ref 0–1.2)
BUN SERPL-MCNC: 10 MG/DL (ref 8–27)
BUN/CREAT SERPL: 10 (ref 10–24)
CALCIUM SERPL-MCNC: 10 MG/DL (ref 8.6–10.2)
CHLORIDE SERPL-SCNC: 100 MMOL/L (ref 96–106)
CO2 SERPL-SCNC: 25 MMOL/L (ref 20–29)
CREAT SERPL-MCNC: 0.99 MG/DL (ref 0.76–1.27)
GLUCOSE SERPL-MCNC: 90 MG/DL (ref 65–99)
POTASSIUM SERPL-SCNC: 4.1 MMOL/L (ref 3.5–5.2)
PROT SERPL-MCNC: 7 G/DL (ref 6–8.5)
SODIUM SERPL-SCNC: 146 MMOL/L (ref 134–144)
SPECIMEN STATUS REPORT, ROLRST: NORMAL

## 2018-12-06 ENCOUNTER — HOSPITAL ENCOUNTER (OUTPATIENT)
Dept: MRI IMAGING | Age: 68
Discharge: HOME | End: 2018-12-06
Attending: NURSE PRACTITIONER

## 2018-12-06 DIAGNOSIS — R16.0 LIVER MASS: ICD-10-CM

## 2018-12-07 LAB
HCV RNA SERPL NAA+PROBE-ACNC: NORMAL IU/ML
HCV RNA SERPL NAA+PROBE-LOG IU: 4.1 LOG10 IU/ML
HCV RNA SERPL QL NAA+PROBE: POSITIVE
TEST INFORMATION: NORMAL

## 2018-12-18 ENCOUNTER — HOSPITAL ENCOUNTER (OUTPATIENT)
Dept: MRI IMAGING | Age: 68
Discharge: HOME | End: 2018-12-18
Attending: NURSE PRACTITIONER
Payer: MEDICARE

## 2018-12-18 VITALS — BODY MASS INDEX: 21.52 KG/M2 | WEIGHT: 150 LBS

## 2018-12-18 PROCEDURE — 74011250636 HC RX REV CODE- 250/636: Performed by: NURSE PRACTITIONER

## 2018-12-18 PROCEDURE — 74183 MRI ABD W/O CNTR FLWD CNTR: CPT

## 2018-12-18 PROCEDURE — A9581 GADOXETATE DISODIUM INJ: HCPCS | Performed by: NURSE PRACTITIONER

## 2018-12-18 PROCEDURE — 74011000258 HC RX REV CODE- 258: Performed by: NURSE PRACTITIONER

## 2018-12-18 RX ORDER — SODIUM CHLORIDE 0.9 % (FLUSH) 0.9 %
10 SYRINGE (ML) INJECTION
Status: COMPLETED | OUTPATIENT
Start: 2018-12-18 | End: 2018-12-18

## 2018-12-18 RX ADMIN — GADOXETATE DISODIUM 7 ML: 181.43 INJECTION, SOLUTION INTRAVENOUS at 21:41

## 2018-12-18 RX ADMIN — SODIUM CHLORIDE 100 ML: 900 INJECTION, SOLUTION INTRAVENOUS at 21:42

## 2018-12-18 RX ADMIN — Medication 10 ML: at 21:42

## 2019-01-02 ENCOUNTER — TELEPHONE (OUTPATIENT)
Dept: HEMATOLOGY | Age: 69
End: 2019-01-02

## 2019-01-02 NOTE — TELEPHONE ENCOUNTER
I called the patient to schedule a follow up appt with Jenn Grande NP. Patient didn't answer. I left a VM for him to return my call as soon as possible.     Valerie Melchor

## 2019-01-04 ENCOUNTER — TELEPHONE (OUTPATIENT)
Dept: HEMATOLOGY | Age: 69
End: 2019-01-04

## 2019-01-04 NOTE — TELEPHONE ENCOUNTER
I called the patient to schedule him an appointment per Emilee Parks. Patient didn't answer. I LVM for him to return my call.     Carlie Jenkins

## 2019-01-09 ENCOUNTER — TELEPHONE (OUTPATIENT)
Dept: HEMATOLOGY | Age: 69
End: 2019-01-09

## 2019-01-09 NOTE — TELEPHONE ENCOUNTER
I called the patient to schedule him an appt per [facility]. Patient scheduled an appt with Andree Mahoney for 01/16/19 at 1:45 PM. He had no questions at this time.     Denis Dent

## 2019-01-16 ENCOUNTER — OFFICE VISIT (OUTPATIENT)
Dept: HEMATOLOGY | Age: 69
End: 2019-01-16

## 2019-01-16 VITALS
HEIGHT: 70 IN | OXYGEN SATURATION: 99 % | TEMPERATURE: 97.8 F | HEART RATE: 46 BPM | SYSTOLIC BLOOD PRESSURE: 144 MMHG | DIASTOLIC BLOOD PRESSURE: 74 MMHG | WEIGHT: 158 LBS | BODY MASS INDEX: 22.62 KG/M2

## 2019-01-16 DIAGNOSIS — B18.2 CHRONIC HEPATITIS C WITHOUT HEPATIC COMA (HCC): Primary | ICD-10-CM

## 2019-01-16 RX ORDER — FLUOCINONIDE 0.5 MG/G
CREAM TOPICAL
Refills: 1 | COMMUNITY
Start: 2018-12-13

## 2019-01-16 RX ORDER — DOXAZOSIN 2 MG/1
TABLET ORAL
Refills: 11 | COMMUNITY
Start: 2018-12-12

## 2019-01-16 NOTE — PROGRESS NOTES
[address] E Jani De Souza MD, [address], [place], [place]       Janes Dey, GABRIELA Briggs NP Braulio Grounds, NP        at [facility]     [address] [facility], [address] [address]  [address].     [phone]     FAX: [phone]    at [address], [address] [facility], [address]     [phone]     FAX: [phone]     Patient Care Team:  Alex Chapman MD as PCP - General (Family Practice)    Problem List  Date Reviewed: 7/12/2018          Codes Class Noted    Chronic hepatitis C without hepatic coma [facility]) ICD-10-CM: B18.2  ICD-9-CM: 070.54  8/4/2017        Liver mass ICD-10-CM: R16.0  ICD-9-CM: 573.9  8/4/2017        Cirrhosis ([facility] ICD-10-CM: K74.60  ICD-9-CM: 571.5  8/4/2017        Hypertension ICD-10-CM: I10  ICD-9-CM: 401.9  8/4/2017        Gallstones ICD-10-CM: K80.20  ICD-9-CM: 574.20  8/4/2017            Shelley Tran returns to the [address] for management of chronic HCV. The active problem list, all pertinent past medical history, medications, radiologic findings and laboratory findings related to the liver disorder were reviewed with the patient. The patient is a 80 y.o. Black male who tested positive for chronic HCV in 2010. MRI of the liver was performed at [facility] in 5/2017. The results of the imaging suggested cirrhosis with a liver mass concerning for HCC vs dysplastic nodule LIRADs-3. Assessment of liver fibrosis was performed with FibroScan. The result was 15.1 kPa which correlates with cirrhosis. The most recent imaging of the liver was MRI performed in 12/2018. Results suggest chronic liver disease. [address] determined on read. The patient states he has completed all medication. His viral load came back detectable on last check. There were compliance issues with Harvoni.      The most recent laboratory studies indicate the liver transaminases are elevated, alkaline phosphatase is normal, tests of hepatic synthetic and metabolic function are normal, and the platelet count is normal.      The patient notes fatigue. The patient has not experienced pain in the right side over the liver, problems concentrating, swelling of the abdomen, swelling of the lower extremities, hematemesis, hematochezia. The patient completes all daily activities without any functional limitations. ALLERGIES  No Known Allergies    MEDICATIONS  Current Outpatient Medications   Medication Sig    doxazosin (CARDURA) 2 mg tablet TAKE 1 TABLET BY MOUTH EVERY DAY    fluocinoNIDE (LIDEX) 0.05 % topical cream APPLY CREAM TWICE A DAY TO AFFECTED AREA     No current facility-administered medications for this visit. SYSTEM REVIEW NOT RELATED TO LIVER DISEASE OR REVIEWED ABOVE:  Constitution systems: Negative for fever, chills, weight gain, weight loss. Eyes: Negative for visual changes. ENT: Negative for sore throat, painful swallowing. Respiratory: Negative for cough, hemoptysis, SOB. Cardiology: Negative for chest pain, palpitations. GI:  Negative for constipation or diarrhea. : Negative for urinary frequency, dysuria, hematuria, nocturia. Skin: Negative for rash. Hematology: Negative for easy bruising, blood clots. Musculo-skeletal: Negative for back pain, muscle pain, weakness. Neurologic: Negative for headaches, dizziness, vertigo, memory problems not related to HE. Psychology: Negative for anxiety, depression. FAMILY HISTORY:  The father  of unknown cause. The mother is alive and healthy. There is no family history of liver disease. SOCIAL HISTORY:  The patient has never been . The patient has 1 child. The patient currently smokes half pack of tobacco daily. The patient consumes 3 alcoholic beverages per day. The patient used to work as a .  The patient has not worked since 1995.      PHYSICAL EXAMINATION:  Visit Vitals  /74 (BP 1 Location: Left arm, BP Patient Position: Sitting)   Pulse (!) 46   Temp 97.8 °F (36.6 °C) (Tympanic)   Ht 5' 10\" (1.778 m)   Wt 158 lb (71.7 kg)   SpO2 99%   BMI 22.67 kg/m²     General: No acute distress. Eyes: Sclera anicteric. ENT: No oral lesions. Nodes: No adenopathy. Skin: No spider angiomata. No jaundice. No palmar erythema. Respiratory: Lungs clear to auscultation. Cardiovascular: Regular heart rate. No murmurs. No JVD. Abdomen: Soft non-tender. Liver size normal to percussion/palpation. Spleen not palpable. No obvious ascites. Extremities: No edema. No muscle wasting. No gross arthritic changes. Neurologic: Alert and oriented. Cranial nerves grossly intact. No asterixis.     LABORATORY STUDIES:  Liver [place] of [address] [facility] [address] Units 12/3/2018 7/12/2018   WBC 3.4 - 10.8 x10E3/uL 7.6 8.4   ANC 1.4 - 7.0 x10E3/uL     HGB 13.0 - 17.7 g/dL 17.2 16.6    - 379 x10E3/uL 284 279   INR 0.8 - 1.2  1.1   AST 0 - 40 IU/L 31 33   ALT 0 - 44 IU/L 25 27   Alk Phos 39 - 117 IU/L 85 82   Bili, Total 0.0 - 1.2 mg/dL 1.2 0.9   Bili, Direct 0.00 - 0.40 mg/dL 0.31 0.27   Albumin 3.6 - 4.8 g/dL 4.5 4.1   BUN 8 - 27 mg/dL 10 8   Creat 0.76 - 1.27 mg/dL 0.99 0.97   Na 134 - 144 mmol/L 146 (H) 142   K 3.5 - 5.2 mmol/L 4.1 4.8   Cl 96 - 106 mmol/L 100 99   CO2 20 - 29 mmol/L 25 29   Glucose 65 - 99 mg/dL 90 86     Liver [place] of [address] [place] [address] Ref Rng & Units 5/21/2018   WBC 3.4 - 10.8 x10E3/uL 8.4   ANC 1.4 - 7.0 x10E3/uL    HGB 13.0 - 17.7 g/dL 17.3    - 379 x10E3/uL 293   INR 0.8 - 1.2 1.1   AST 0 - 40 IU/L 31   ALT 0 - 44 IU/L 26   Alk Phos 39 - 117 IU/L 73   Bili, Total 0.0 - 1.2 mg/dL 0.9   Bili, Direct 0.00 - 0.40 mg/dL 0.25   Albumin 3.6 - 4.8 g/dL 4.3   BUN 8 - 27 mg/dL 8   Creat 0.76 - 1.27 mg/dL 1.05   Na 134 - 144 mmol/L 145 (H)   K 3.5 - 5.2 mmol/L 4.2   Cl 96 - 106 mmol/L 101   CO2 20 - 29 mmol/L 26 Glucose 65 - 99 mg/dL 77     SEROLOGIES:  Serologies Latest Ref Rng & Units 12/3/2018 3/26/2018 8/4/2017   Hep A Ab, Total Negative   Positive (A)   Hep B Surface Ag Negative   Negative   Hep B Core Ab, Total Negative   Positive (A)   Hep B Surface AB QL    Reactive   Hep C Genotype    1b   HCV RT-PCR, Quant IU/mL 12,500 17,200 354,000     Virology Latest Ref Rng & Units 12/3/2018 7/12/2018 5/21/2018   HCV RNA, NICHOLAS, QL Negative Positive (A) Negative Negative     Virology Latest Ref Rng & Units 3/26/2018 8/4/2017   HCV RNA, NICHOLAS, QL Negative Positive (A) Positive (A)     LIVER HISTOLOGY:  10/2017. FibroScan performed at [address]. EkPa was 15.1. IQR/med 9%. The results suggested a fibrosis level of F3-4. ENDOSCOPIC PROCEDURES:  Not available or performed    RADIOLOGY:  12/2018. MRI w/wo contrast. Unchanged mass in the left lobe near the junction of segments 2 and 3 demonstrating isointense signal on the 20 minute delayed Eovist sequence consistent with focal nodular hyperplasia. Cholelithiasis. 6/2018. MRI/MRCP. 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    9/2017. Dynamic MRI of liver. Normal appearing liver. No evidence of cirrhosis. No liver mass lesions. Normal spleen. No dilated bile ducts. No bile duct strictures. No ascites. 5/2017. Dynamic MRI at [facility]. Changes consistent with cirrhosis. 2.2 x 2.9 cm lesion left lobe with enhancement and washout but no rim enhancement. Consistent with dysplastic nodule. LIRADS-3    OTHER TESTING:  Not available or performed    ASSESSMENT AND PLAN:  Chronic HCV with stage 3 advanced fibrosis/cirrhosis. The patient now again has detectable levels of the hepatitis C virus. We discussed at length how to take the medication, to not miss doses, to take it every day at the same time. He also needs to be able to be contacted and stay in communication with us during treatment. This will be his last shot at being cured.  I will order 12 weeks of Vosevi. Liver function is normal. The platelet count is normal.      An MRI at [facility] in 5/2017 suggested a liver mass LIRADS-3. Repeat MRI at [facility] in 9/2017 did not suggest cirrhosis and no definitive mass was seen. Confirmed with liver dedicated MRI it is an [address]. The patient was directed to continue all current medications at the current dosages. There are no contraindications for the patient to take any medications that are necessary for treatment of other medical issues. The patient was counseled regarding alcohol consumption. Vaccination for viral hepatitis A and B is not needed. The patient has serologic evidence of prior exposure or vaccination with immunity. All of the above issues were discussed with the patient. All questions were answered. The patient expressed a clear understanding of the above.     RYAN Spence-BC  Liver [place] of [facility] [address] [facility], [address] [address]  [phone]

## 2019-01-16 NOTE — PROGRESS NOTES
1. Have you been to the ER, urgent care clinic since your last visit? Hospitalized since your last visit? No    2. Have you seen or consulted any other health care providers outside of the [address] [place] [address] since your last visit? Include any pap smears or colon screening. No     Chief Complaint   Patient presents with    Follow-up     Visit Vitals  /74 (BP 1 Location: Left arm, BP Patient Position: Sitting)   Pulse (!) 46   Temp 97.8 °F (36.6 °C) (Tympanic)   Ht 5' 10\" (1.778 m)   Wt 158 lb (71.7 kg)   SpO2 99%   BMI 22.67 kg/m²     PHQ over the last two weeks 1/16/2019   Little interest or pleasure in doing things Not at all   Feeling down, depressed, irritable, or hopeless Not at all   Total Score PHQ 2 0     Fall Risk Assessment, last 12 mths 1/16/2019   Able to walk? Yes   Fall in past 12 months? No   Fall with injury? -   Number of falls in past 12 months -   Fall Risk Score -       Learning Assessment 1/16/2019   PRIMARY LEARNER Patient   BARRIERS PRIMARY LEARNER NONE   CO-LEARNER CAREGIVER No   PRIMARY LANGUAGE ENGLISH   LEARNER PREFERENCE PRIMARY LISTENING   ANSWERED BY patient   RELATIONSHIP SELF     Abuse Screening Questionnaire 1/16/2019   Do you ever feel afraid of your partner? N   Are you in a relationship with someone who physically or mentally threatens you? N   Is it safe for you to go home?  Yordy Dey

## 2019-01-17 LAB
ALBUMIN SERPL-MCNC: 4 G/DL (ref 3.6–4.8)
ALP SERPL-CCNC: 71 IU/L (ref 39–117)
ALT SERPL-CCNC: 26 IU/L (ref 0–44)
AST SERPL-CCNC: 32 IU/L (ref 0–40)
BILIRUB DIRECT SERPL-MCNC: 0.25 MG/DL (ref 0–0.4)
BILIRUB SERPL-MCNC: 0.9 MG/DL (ref 0–1.2)
BUN SERPL-MCNC: 7 MG/DL (ref 8–27)
BUN/CREAT SERPL: 8 (ref 10–24)
CALCIUM SERPL-MCNC: 9.4 MG/DL (ref 8.6–10.2)
CHLORIDE SERPL-SCNC: 101 MMOL/L (ref 96–106)
CO2 SERPL-SCNC: 25 MMOL/L (ref 20–29)
CREAT SERPL-MCNC: 0.87 MG/DL (ref 0.76–1.27)
ERYTHROCYTE [DISTWIDTH] IN BLOOD BY AUTOMATED COUNT: 13.5 % (ref 12.3–15.4)
GLUCOSE SERPL-MCNC: 93 MG/DL (ref 65–99)
HCT VFR BLD AUTO: 49.6 % (ref 37.5–51)
HGB BLD-MCNC: 16.6 G/DL (ref 13–17.7)
INR PPP: 1.1 (ref 0.8–1.2)
MCH RBC QN AUTO: 31.6 PG (ref 26.6–33)
MCHC RBC AUTO-ENTMCNC: 33.5 G/DL (ref 31.5–35.7)
MCV RBC AUTO: 95 FL (ref 79–97)
PLATELET # BLD AUTO: 281 X10E3/UL (ref 150–379)
POTASSIUM SERPL-SCNC: 4.4 MMOL/L (ref 3.5–5.2)
PROT SERPL-MCNC: 6.8 G/DL (ref 6–8.5)
PROTHROMBIN TIME: 11.2 SEC (ref 9.1–12)
RBC # BLD AUTO: 5.25 X10E6/UL (ref 4.14–5.8)
SODIUM SERPL-SCNC: 141 MMOL/L (ref 134–144)
WBC # BLD AUTO: 5.9 X10E3/UL (ref 3.4–10.8)

## 2019-01-21 LAB
HCV RNA SERPL NAA+PROBE-ACNC: NORMAL IU/ML
HCV RNA SERPL NAA+PROBE-LOG IU: 5.03 LOG10 IU/ML
HCV RNA SERPL QL NAA+PROBE: POSITIVE
TEST INFORMATION: NORMAL

## 2019-02-27 ENCOUNTER — OFFICE VISIT (OUTPATIENT)
Dept: HEMATOLOGY | Age: 69
End: 2019-02-27

## 2019-02-27 VITALS
WEIGHT: 157.4 LBS | BODY MASS INDEX: 22.54 KG/M2 | SYSTOLIC BLOOD PRESSURE: 154 MMHG | DIASTOLIC BLOOD PRESSURE: 68 MMHG | TEMPERATURE: 97.9 F | OXYGEN SATURATION: 98 % | HEART RATE: 52 BPM | HEIGHT: 70 IN

## 2019-02-27 DIAGNOSIS — K74.69 OTHER CIRRHOSIS OF LIVER (HCC): ICD-10-CM

## 2019-02-27 DIAGNOSIS — B18.2 CHRONIC HEPATITIS C WITHOUT HEPATIC COMA (HCC): Primary | ICD-10-CM

## 2019-02-27 NOTE — PROGRESS NOTES
1. Have you been to the ER, urgent care clinic since your last visit? Hospitalized since your last visit? No    2. Have you seen or consulted any other health care providers outside of the [address] [place] [address] since your last visit? Include any pap smears or colon screening. No     Chief Complaint   Patient presents with    Follow-up     Visit Vitals  /68 (BP 1 Location: Left arm, BP Patient Position: Sitting)   Pulse (!) 52   Temp 97.9 °F (36.6 °C) (Tympanic)   Ht 5' 10\" (1.778 m)   Wt 157 lb 6.4 oz (71.4 kg)   SpO2 98%   BMI 22.58 kg/m²       3 most recent PHQ Screens 2/27/2019   Little interest or pleasure in doing things Not at all   Feeling down, depressed, irritable, or hopeless Not at all   Total Score PHQ 2 0     Fall Risk Assessment, last 12 mths 2/27/2019   Able to walk? Yes   Fall in past 12 months? No   Fall with injury? -   Number of falls in past 12 months -   Fall Risk Score -       Learning Assessment 2/27/2019   PRIMARY LEARNER Patient   BARRIERS PRIMARY LEARNER NONE   CO-LEARNER CAREGIVER No   PRIMARY LANGUAGE ENGLISH   LEARNER PREFERENCE PRIMARY LISTENING   ANSWERED BY patient   RELATIONSHIP SELF     Abuse Screening Questionnaire 2/27/2019   Do you ever feel afraid of your partner? N   Are you in a relationship with someone who physically or mentally threatens you? N   Is it safe for you to go home?  Yordy Dey

## 2019-02-27 NOTE — PROGRESS NOTES
[address] E Jani De Souza MD, Jeny Larkin, [place], [place]       ALEXANDRO Rosales PA-C Jade Congo, NP Kip Foot, NP        at [facility] AT [address], [address] [address]  [address].     [phone]     FAX: [phone]    at [address], [address], [address]     [phone]     FAX: [phone]     Patient Care Team:  Amy Torres MD as PCP - General (Family Practice)    Problem List  Date Reviewed: 1/17/2019          Codes Class Noted    Chronic hepatitis C without hepatic coma [facility]) ICD-10-CM: B18.2  ICD-9-CM: 070.54  8/4/2017        Liver mass ICD-10-CM: R16.0  ICD-9-CM: 573.9  8/4/2017        Cirrhosis ([facility] ICD-10-CM: K74.60  ICD-9-CM: 571.5  8/4/2017        Hypertension ICD-10-CM: I10  ICD-9-CM: 401.9  8/4/2017        Gallstones ICD-10-CM: K80.20  ICD-9-CM: 574.20  8/4/2017            Mikayla James returns to the [facility] for management of chronic HCV. The active problem list, all pertinent past medical history, medications, radiologic findings and laboratory findings related to the liver disorder were reviewed with the patient. The patient is a 80 y.o. Black male who tested positive for chronic HCV in 2010. MRI of the liver was performed at [facility] in 5/2017. The results of the imaging suggested cirrhosis with a liver mass concerning for HCC vs dysplastic nodule LIRADs-3. Assessment of liver fibrosis was performed with FibroScan. The result was 15.1 kPa which correlates with cirrhosis. The most recent imaging of the liver was MRI performed in 12/2018. Results suggest chronic liver disease. [address] determined on read. He is currently on 12 weeks of Vosevi. He had some minor headaches the first 2-3 days but this has resolved. He also notes fatigue but otherwise is doing fine on the medication.  He had been taking it appropriately at 18:00 with food. He did miss one dose. The most recent laboratory studies indicate the liver transaminases are elevated, alkaline phosphatase is normal, tests of hepatic synthetic and metabolic function are normal, and the platelet count is normal.      The patient notes fatigue. The patient has not experienced pain in the right side over the liver, problems concentrating, swelling of the abdomen, swelling of the lower extremities, hematemesis or hematochezia. The patient completes all daily activities without any functional limitations. ALLERGIES  No Known Allergies    MEDICATIONS  Current Outpatient Medications   Medication Sig    doxazosin (CARDURA) 2 mg tablet TAKE 1 TABLET BY MOUTH EVERY DAY    fluocinoNIDE (LIDEX) 0.05 % topical cream APPLY CREAM TWICE A DAY TO AFFECTED AREA    sofosbuvir-velpatas-voxilaprev (VOSEVI) 400-100-100 mg tab Take 1 Tab by mouth daily. No current facility-administered medications for this visit. SYSTEM REVIEW NOT RELATED TO LIVER DISEASE OR REVIEWED ABOVE:  Constitution systems: Negative for fever, chills, weight gain, weight loss. Eyes: Negative for visual changes. ENT: Negative for sore throat, painful swallowing. Respiratory: Negative for cough, hemoptysis, SOB. Cardiology: Negative for chest pain, palpitations. GI:  Negative for constipation or diarrhea. : Negative for urinary frequency, dysuria, hematuria, nocturia. Skin: Negative for rash. Hematology: Negative for easy bruising, blood clots. Musculo-skeletal: Negative for back pain, muscle pain, weakness. Neurologic: Negative for headaches, dizziness, vertigo, memory problems not related to HE. Psychology: Negative for anxiety, depression. FAMILY HISTORY:  The father  of unknown cause. The mother is alive and healthy. There is no family history of liver disease. SOCIAL HISTORY:  The patient has never been .     The patient has 1 child.    The patient currently smokes half pack of tobacco daily. The patient consumes 3 alcoholic beverages per day. The patient used to work as a . The patient has not worked since [address] [place] [address]. PHYSICAL EXAMINATION:  Visit Vitals  /68 (BP 1 Location: Left arm, BP Patient Position: Sitting)   Pulse (!) 52   Temp 97.9 °F (36.6 °C) (Tympanic)   Ht 5' 10\" (1.778 m)   Wt 157 lb 6.4 oz (71.4 kg)   SpO2 98%   BMI 22.58 kg/m²     General: No acute distress. Eyes: Sclera anicteric. ENT: No oral lesions. Nodes: No adenopathy. Skin: No spider angiomata. No jaundice. No palmar erythema. Respiratory: Lungs clear to auscultation. Cardiovascular: Regular heart rate. No murmurs. No JVD. Abdomen: Soft non-tender. Liver size normal to percussion/palpation. Spleen not palpable. No obvious ascites. Extremities: No edema. No muscle wasting. No gross arthritic changes. Neurologic: Alert and oriented. Cranial nerves grossly intact. No asterixis.     LABORATORY STUDIES:  Liver [place] of [address] [place] [address] Units 12/3/2018 7/12/2018   WBC 3.4 - 10.8 x10E3/uL 7.6 8.4   ANC 1.4 - 7.0 x10E3/uL     HGB 13.0 - 17.7 g/dL 17.2 16.6    - 379 x10E3/uL 284 279   INR 0.8 - 1.2  1.1   AST 0 - 40 IU/L 31 33   ALT 0 - 44 IU/L 25 27   Alk Phos 39 - 117 IU/L 85 82   Bili, Total 0.0 - 1.2 mg/dL 1.2 0.9   Bili, Direct 0.00 - 0.40 mg/dL 0.31 0.27   Albumin 3.6 - 4.8 g/dL 4.5 4.1   BUN 8 - 27 mg/dL 10 8   Creat 0.76 - 1.27 mg/dL 0.99 0.97   Na 134 - 144 mmol/L 146 (H) 142   K 3.5 - 5.2 mmol/L 4.1 4.8   Cl 96 - 106 mmol/L 100 99   CO2 20 - 29 mmol/L 25 29   Glucose 65 - 99 mg/dL 90 86     Liver [place] of [place] Latest Ref Rng & Units 5/21/2018   WBC 3.4 - 10.8 x10E3/uL 8.4   ANC 1.4 - 7.0 x10E3/uL    HGB 13.0 - 17.7 g/dL 17.3    - 379 x10E3/uL 293   INR 0.8 - 1.2 1.1   AST 0 - 40 IU/L 31   ALT 0 - 44 IU/L 26   Alk Phos 39 - 117 IU/L 73   Bili, Total 0.0 - 1.2 mg/dL 0.9   Bili, Direct 0.00 - 0.40 mg/dL 0.25   Albumin 3.6 - 4.8 g/dL 4.3   BUN 8 - 27 mg/dL 8   Creat 0.76 - 1.27 mg/dL 1.05   Na 134 - 144 mmol/L 145 (H)   K 3.5 - 5.2 mmol/L 4.2   Cl 96 - 106 mmol/L 101   CO2 20 - 29 mmol/L 26   Glucose 65 - 99 mg/dL 77     SEROLOGIES:  Serologies Latest Ref Rng & Units 12/3/2018 3/26/2018 8/4/2017   Hep A Ab, Total Negative   Positive (A)   Hep B Surface Ag Negative   Negative   Hep B Core Ab, Total Negative   Positive (A)   Hep B Surface AB QL    Reactive   Hep C Genotype    1b   HCV RT-PCR, Quant IU/mL 12,500 17,200 354,000     Virology Latest Ref Rng & Units 12/3/2018 7/12/2018 5/21/2018   HCV RNA, NICHOLAS, QL Negative Positive (A) Negative Negative     Virology Latest Ref Rng & Units 3/26/2018 8/4/2017   HCV RNA, NICHOLAS, QL Negative Positive (A) Positive (A)     LIVER HISTOLOGY:  10/2017. FibroScan performed at [address]. EkPa was 15.1. IQR/med 9%. The results suggested a fibrosis level of F3-4. ENDOSCOPIC PROCEDURES:  Not available or performed    RADIOLOGY:  12/2018. MRI w/wo contrast. Unchanged mass in the left lobe near the junction of segments 2 and 3 demonstrating isointense signal on the 20 minute delayed Eovist sequence consistent with focal nodular hyperplasia. Cholelithiasis. 6/2018. MRI/MRCP. 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    9/2017. Dynamic MRI of liver. Normal appearing liver. No evidence of cirrhosis. No liver mass lesions. Normal spleen. No dilated bile ducts. No bile duct strictures. No ascites. 5/2017. Dynamic MRI at [facility]. Changes consistent with cirrhosis. 2.2 x 2.9 cm lesion left lobe with enhancement and washout but no rim enhancement. Consistent with dysplastic nodule. LIRADS-3    OTHER TESTING:  Not available or performed    ASSESSMENT AND PLAN:  Chronic HCV with stage 3 advanced fibrosis/cirrhosis. The patient after completing previous therapy inconsistently, had a return of his virus. He is currently on 15 of Vosevi.  He has been taking his medication diligently and has only missed one dose. He has received his next 28 days already. Continue to take the medication all of the way through, regardless of viral load today. He understands this. Liver function is normal. The platelet count is normal.      An MRI at [facility] in 5/2017 suggested a liver mass LIRADS-3. Repeat MRI at [facility] in 9/2017 did not suggest cirrhosis and no definitive mass was seen. Confirmed with liver dedicated MRI it is an [address]. [facility] screening: AFP 12/2018 normal, MRI 12/2018 no masses or lesions. Next due 6/2019. The patient was directed to continue all current medications at the current dosages. There are no contraindications for the patient to take any medications that are necessary for treatment of other medical issues. The patient was counseled regarding alcohol consumption. VACCINATIONS:  Vaccination for viral hepatitis A and B is not needed. The patient has serologic evidence of prior exposure or vaccination with immunity. All of the above issues were discussed with the patient. All questions were answered. The patient expressed a clear understanding of the above. Return to the [place] [address] in 8 weeks at end of treatment.      Angel Dailey BannerNADINE-BC  Liver [place] of [address] [facility] [address] [place] [address], [address] [address]  [address].  [phone]

## 2019-02-28 LAB
ALBUMIN SERPL-MCNC: 4 G/DL (ref 3.6–4.8)
ALP SERPL-CCNC: 71 IU/L (ref 39–117)
ALT SERPL-CCNC: 18 IU/L (ref 0–44)
AST SERPL-CCNC: 21 IU/L (ref 0–40)
BILIRUB DIRECT SERPL-MCNC: 0.26 MG/DL (ref 0–0.4)
BILIRUB SERPL-MCNC: 0.8 MG/DL (ref 0–1.2)
BUN SERPL-MCNC: 8 MG/DL (ref 8–27)
BUN/CREAT SERPL: 9 (ref 10–24)
CALCIUM SERPL-MCNC: 9.1 MG/DL (ref 8.6–10.2)
CHLORIDE SERPL-SCNC: 103 MMOL/L (ref 96–106)
CO2 SERPL-SCNC: 29 MMOL/L (ref 20–29)
CREAT SERPL-MCNC: 0.9 MG/DL (ref 0.76–1.27)
ERYTHROCYTE [DISTWIDTH] IN BLOOD BY AUTOMATED COUNT: 13.3 % (ref 12.3–15.4)
GLUCOSE SERPL-MCNC: 92 MG/DL (ref 65–99)
HCT VFR BLD AUTO: 46.7 % (ref 37.5–51)
HGB BLD-MCNC: 15.7 G/DL (ref 13–17.7)
INR PPP: 1.1 (ref 0.8–1.2)
MCH RBC QN AUTO: 31 PG (ref 26.6–33)
MCHC RBC AUTO-ENTMCNC: 33.6 G/DL (ref 31.5–35.7)
MCV RBC AUTO: 92 FL (ref 79–97)
PLATELET # BLD AUTO: 306 X10E3/UL (ref 150–379)
POTASSIUM SERPL-SCNC: 4.2 MMOL/L (ref 3.5–5.2)
PROT SERPL-MCNC: 6.4 G/DL (ref 6–8.5)
PROTHROMBIN TIME: 11.3 SEC (ref 9.1–12)
RBC # BLD AUTO: 5.07 X10E6/UL (ref 4.14–5.8)
SODIUM SERPL-SCNC: 146 MMOL/L (ref 134–144)
WBC # BLD AUTO: 6.7 X10E3/UL (ref 3.4–10.8)

## 2019-03-01 LAB — HCV RNA SERPL QL NAA+PROBE: NEGATIVE

## 2019-04-29 ENCOUNTER — OFFICE VISIT (OUTPATIENT)
Dept: HEMATOLOGY | Age: 69
End: 2019-04-29

## 2019-04-29 VITALS
BODY MASS INDEX: 21.79 KG/M2 | HEIGHT: 70 IN | TEMPERATURE: 97.5 F | OXYGEN SATURATION: 100 % | WEIGHT: 152.2 LBS | SYSTOLIC BLOOD PRESSURE: 132 MMHG | DIASTOLIC BLOOD PRESSURE: 65 MMHG | HEART RATE: 52 BPM

## 2019-04-29 DIAGNOSIS — B18.2 CHRONIC HEPATITIS C WITHOUT HEPATIC COMA (HCC): Primary | ICD-10-CM

## 2019-04-29 NOTE — PROGRESS NOTES
[address] [facility], [place], [address], [place], [place]       GABRIELA Cevallos, [facility]   ALEXANDRO Coleman NP [address]    at [address], [address] [address]  [address].    [phone]    FAX: [address] [facility] [address]    at [address], [address]    [phone]    FAX: [phone]     Patient Care Team:  Nani West MD as PCP - General (Family Practice)    Problem List  Date Reviewed: 2/27/2019          Codes Class Noted    Chronic hepatitis C without hepatic coma [facility]) ICD-10-CM: B18.2  ICD-9-CM: 070.54  8/4/2017        Liver mass ICD-10-CM: R16.0  ICD-9-CM: 573.9  8/4/2017        Cirrhosis ([facility] ICD-10-CM: K74.60  ICD-9-CM: 571.5  8/4/2017        Hypertension ICD-10-CM: I10  ICD-9-CM: 401.9  8/4/2017        Gallstones ICD-10-CM: K80.20  ICD-9-CM: 574.20  8/4/2017            Prabha Dey returns to the [address] for management of chronic HCV. The active problem list, all pertinent past medical history, medications, radiologic findings and laboratory findings related to the liver disorder were reviewed with the patient. The patient is a 80 y.o. Black male who tested positive for chronic HCV in 2010. 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 Assessment of liver fibrosis was performed with FibroScan. The result was 15.1 kPa which correlates with cirrhosis, although his labs do not appear cirrhotic. He is currently on 12 weeks of Vosevi. He thinks he may have missed three days. He still has some medicine left, he guesses about a week.      The most recent laboratory studies indicate the liver transaminases are normal, alkaline phosphatase is normal, tests of hepatic synthetic and metabolic function are normal and the platelet count is normal.      The patient notes fatigue. The patient has not experienced pain in the right side over the liver, problems concentrating, swelling of the abdomen, swelling of the lower extremities, hematemesis or hematochezia. The patient completes all daily activities without any functional limitations. ALLERGIES  No Known Allergies    MEDICATIONS  Current Outpatient Medications   Medication Sig    doxazosin (CARDURA) 2 mg tablet TAKE 1 TABLET BY MOUTH EVERY DAY    fluocinoNIDE (LIDEX) 0.05 % topical cream APPLY CREAM TWICE A DAY TO AFFECTED AREA    sofosbuvir-velpatas-voxilaprev (VOSEVI) 400-100-100 mg tab Take 1 Tab by mouth daily. No current facility-administered medications for this visit. SYSTEM REVIEW NOT RELATED TO LIVER DISEASE OR REVIEWED ABOVE:  Constitution systems: Negative for fever, chills, weight gain, weight loss. Eyes: Negative for visual changes. ENT: Negative for sore throat, painful swallowing. Respiratory: Negative for cough, hemoptysis, SOB. Cardiology: Negative for chest pain, palpitations. GI:  Negative for constipation or diarrhea. : Negative for urinary frequency, dysuria, hematuria, nocturia. Skin: Negative for rash. Hematology: Negative for easy bruising, blood clots. Musculo-skeletal: Negative for back pain, muscle pain, weakness. Neurologic: Negative for headaches, dizziness, vertigo, memory problems not related to HE. Psychology: Negative for anxiety, depression. FAMILY HISTORY:  The father  of unknown cause. The mother is alive and healthy. There is no family history of liver disease. SOCIAL HISTORY:  The patient has never been . The patient has 1 child. The patient currently smokes half pack of tobacco daily. The patient consumes 3 alcoholic beverages per day. The patient used to work as a .  The patient has not worked since [address] [place] [address]. PHYSICAL EXAMINATION:  Visit Vitals  /65 (BP 1 Location: Left arm, BP Patient Position: Sitting)   Pulse (!) 52   Temp 97.5 °F (36.4 °C) (Tympanic)   Ht 5' 10\" (1.778 m)   Wt 152 lb 3.2 oz (69 kg)   SpO2 100%   BMI 21.84 kg/m²     General: No acute distress. Eyes: Sclera anicteric. ENT: No oral lesions. Nodes: No adenopathy. Skin: No spider angiomata. No jaundice. No palmar erythema. Respiratory: Lungs clear to auscultation. Cardiovascular: Regular heart rate. No murmurs. No JVD. Abdomen: Soft non-tender. Liver size normal to percussion/palpation. Spleen not palpable. No obvious ascites. Extremities: No edema. No muscle wasting. No gross arthritic changes. Neurologic: Alert and oriented. Cranial nerves grossly intact. No asterixis.     LABORATORY STUDIES:  [address] Units 2/27/2019 1/16/2019   WBC 3.4 - 10.8 x10E3/uL 6.7 5.9   ANC 1.4 - 7.0 x10E3/uL     HGB 13.0 - 17.7 g/dL 15.7 16.6    - 379 x10E3/uL 306 281   INR 0.8 - 1.2 1.1 1.1   AST 0 - 40 IU/L 21 32   ALT 0 - 44 IU/L 18 26   Alk Phos 39 - 117 IU/L 71 71   Bili, Total 0.0 - 1.2 mg/dL 0.8 0.9   Bili, Direct 0.00 - 0.40 mg/dL 0.26 0.25   Albumin 3.6 - 4.8 g/dL 4.0 4.0   BUN 8 - 27 mg/dL 8 7 (L)   Creat 0.76 - 1.27 mg/dL 0.90 0.87   Na 134 - 144 mmol/L 146 (H) 141   K 3.5 - 5.2 mmol/L 4.2 4.4   Cl 96 - 106 mmol/L 103 101   CO2 20 - 29 mmol/L 29 25   Glucose 65 - 99 mg/dL 92 93     Liver [facility] Latest Ref Rng & Units 12/3/2018   WBC 3.4 - 10.8 x10E3/uL 7.6   ANC 1.4 - 7.0 x10E3/uL    HGB 13.0 - 17.7 g/dL 17.2    - 379 x10E3/uL 284   INR 0.8 - 1.2    AST 0 - 40 IU/L 31   ALT 0 - 44 IU/L 25   Alk Phos 39 - 117 IU/L 85   Bili, Total 0.0 - 1.2 mg/dL 1.2   Bili, Direct 0.00 - 0.40 mg/dL 0.31   Albumin 3.6 - 4.8 g/dL 4.5   BUN 8 - 27 mg/dL 10   Creat 0.76 - 1.27 mg/dL 0.99   Na 134 - 144 mmol/L 146 (H)   K 3.5 - 5.2 mmol/L 4.1   Cl 96 - 106 mmol/L 100 CO2 20 - 29 mmol/L 25   Glucose 65 - 99 mg/dL 90     SEROLOGIES:  Serologies Latest Ref Rng & Units 12/3/2018 3/26/2018 8/4/2017   Hep A Ab, Total Negative   Positive (A)   Hep B Surface Ag Negative   Negative   Hep B Core Ab, Total Negative   Positive (A)   Hep B Surface AB QL    Reactive   Hep C Genotype    1b   HCV RT-PCR, Quant IU/mL 12,500 17,200 354,000     Virology Latest Ref Rng & Units 2/27/2019 1/16/2019 12/3/2018   HCV RNA, NICHOLAS, QL Negative Negative Positive (A) Positive (A)     Virology Latest Ref Rng & Units 7/12/2018   HCV RNA, NICHOLAS, QL Negative Negative     LIVER HISTOLOGY:  10/2017. [facility] EkPa was 15.1. IQR/med 9%. The results suggested a fibrosis level of F3 - F4.    ENDOSCOPIC PROCEDURES:  Not available or performed    RADIOLOGY:  12/2018. MRI w/wo contrast. Unchanged mass in the left lobe near the junction of segments 2 and 3 demonstrating isointense signal on the 20 minute delayed Eovist sequence consistent with focal nodular hyperplasia. Cholelithiasis. 6/2018. MRI/MRCP. 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    9/2017. Dynamic MRI of liver. Normal appearing liver. No evidence of cirrhosis. No liver mass lesions. Normal spleen. No dilated bile ducts. No bile duct strictures. No ascites. 5/2017. Dynamic MRI at [facility]. Changes consistent with cirrhosis. 2.2 x 2.9 cm lesion left lobe with enhancement and washout but no rim enhancement. Consistent with dysplastic nodule. LIRADS-3    OTHER TESTING:  Not available or performed    ASSESSMENT AND PLAN:  Chronic HCV with stage 3 advanced fibrosis/cirrhosis. The patient after completing previous therapy inconsistently, had a return of his virus. He is currently on 15 of Vosevi. He thinks he has missed 3 days since last visit. Continue to take the medication all of the way through, regardless of viral load today. He understands this.      Liver function is normal. The platelet count is normal.      An MRI at [facility] in 5/2017 suggested a liver mass LIRADS-3. Repeat MRI at [facility] in 9/2017 did not suggest cirrhosis and no definitive mass was seen. Confirmed with liver dedicated MRI it is an [address] screening: AFP 12/2018 normal, MRI 12/2018 no masses or lesions. Next due 6/2019. Ordered today. The patient was directed to continue all current medications at the current dosages. There are no contraindications for the patient to take any medications necessary for treatment of other medical issues. The patient was counseled regarding alcohol consumption. Vaccinations:  Vaccination for viral hepatitis A and B is not needed. The patient has serologic evidence of prior exposure or vaccination with immunity. Follow up: All of the above issues were discussed with the patient. All questions were answered. The patient expressed a clear understanding of the above. Return to the [address] [address] in 3 months for SVR visit.      RYAN Nguyễn-BC  Liver [facility] [address] [facility], [address] [address]  [address].  [phone]

## 2019-04-29 NOTE — PROGRESS NOTES
1. Have you been to the ER, urgent care clinic since your last visit? Hospitalized since your last visit? No    2. Have you seen or consulted any other health care providers outside of the [address] [place] [address] since your last visit? Include any pap smears or colon screening. No     Chief Complaint   Patient presents with    Follow-up     Visit Vitals  /65 (BP 1 Location: Left arm, BP Patient Position: Sitting)   Pulse (!) 52   Temp 97.5 °F (36.4 °C) (Tympanic)   Ht 5' 10\" (1.778 m)   Wt 152 lb 3.2 oz (69 kg)   SpO2 100%   BMI 21.84 kg/m²       3 most recent PHQ Screens 4/29/2019   Little interest or pleasure in doing things Not at all   Feeling down, depressed, irritable, or hopeless Not at all   Total Score PHQ 2 0     Fall Risk Assessment, last 12 mths 4/29/2019   Able to walk? Yes   Fall in past 12 months? No   Fall with injury? -   Number of falls in past 12 months -   Fall Risk Score -       Learning Assessment 4/29/2019   PRIMARY LEARNER Patient   BARRIERS PRIMARY LEARNER NONE   CO-LEARNER CAREGIVER No   PRIMARY LANGUAGE ENGLISH   LEARNER PREFERENCE PRIMARY LISTENING   ANSWERED BY patient   RELATIONSHIP SELF     Abuse Screening Questionnaire 4/29/2019   Do you ever feel afraid of your partner? N   Are you in a relationship with someone who physically or mentally threatens you? N   Is it safe for you to go home?  Emir Reddy

## 2019-04-29 NOTE — LETTER
4/29/19 Patient: Dennie Furth YOB: 1950 Date of Visit: 4/29/2019 Winston Huffman MD 
[facility] [place] [id number] VIA Facsimile: [phone] Dear Winston Huffman MD, Thank you for referring Mr. Jasmin Barnespool to [address] for evaluation. My notes for this consultation are attached. If you have questions, please do not hesitate to call me. I look forward to following your patient along with you. Sincerely, Tamara De La Cruz NP

## 2019-04-30 LAB
ALBUMIN SERPL-MCNC: 4.2 G/DL (ref 3.6–4.8)
ALP SERPL-CCNC: 83 IU/L (ref 39–117)
ALT SERPL-CCNC: 20 IU/L (ref 0–44)
AST SERPL-CCNC: 30 IU/L (ref 0–40)
BILIRUB DIRECT SERPL-MCNC: 0.32 MG/DL (ref 0–0.4)
BILIRUB SERPL-MCNC: 1.2 MG/DL (ref 0–1.2)
BUN SERPL-MCNC: 10 MG/DL (ref 8–27)
BUN/CREAT SERPL: 12 (ref 10–24)
CALCIUM SERPL-MCNC: 9.7 MG/DL (ref 8.6–10.2)
CHLORIDE SERPL-SCNC: 102 MMOL/L (ref 96–106)
CO2 SERPL-SCNC: 27 MMOL/L (ref 20–29)
CREAT SERPL-MCNC: 0.84 MG/DL (ref 0.76–1.27)
ERYTHROCYTE [DISTWIDTH] IN BLOOD BY AUTOMATED COUNT: 13.2 % (ref 12.3–15.4)
GLUCOSE SERPL-MCNC: 115 MG/DL (ref 65–99)
HCT VFR BLD AUTO: 49.1 % (ref 37.5–51)
HGB BLD-MCNC: 16.4 G/DL (ref 13–17.7)
INR PPP: 1.1 (ref 0.8–1.2)
MCH RBC QN AUTO: 31.4 PG (ref 26.6–33)
MCHC RBC AUTO-ENTMCNC: 33.4 G/DL (ref 31.5–35.7)
MCV RBC AUTO: 94 FL (ref 79–97)
PLATELET # BLD AUTO: 307 X10E3/UL (ref 150–379)
POTASSIUM SERPL-SCNC: 4.4 MMOL/L (ref 3.5–5.2)
PROT SERPL-MCNC: 7.3 G/DL (ref 6–8.5)
PROTHROMBIN TIME: 11.2 SEC (ref 9.1–12)
RBC # BLD AUTO: 5.23 X10E6/UL (ref 4.14–5.8)
SODIUM SERPL-SCNC: 142 MMOL/L (ref 134–144)
WBC # BLD AUTO: 5.6 X10E3/UL (ref 3.4–10.8)

## 2019-05-01 LAB
AFP L3 MFR SERPL: 39 % (ref 0–9.9)
AFP SERPL-MCNC: 23.9 NG/ML (ref 0–8)
HCV RNA SERPL QL NAA+PROBE: NEGATIVE

## 2020-02-18 ENCOUNTER — TELEPHONE (OUTPATIENT)
Dept: HEMATOLOGY | Age: 70
End: 2020-02-18

## 2020-02-19 NOTE — TELEPHONE ENCOUNTER
2nd attempt: called pt and spoke to pt mother. mother stated that she will relay the message and her son will call us back to schedule for follow up appointment with Bubba Sol.